# Patient Record
Sex: MALE | Race: WHITE | Employment: OTHER | URBAN - METROPOLITAN AREA
[De-identification: names, ages, dates, MRNs, and addresses within clinical notes are randomized per-mention and may not be internally consistent; named-entity substitution may affect disease eponyms.]

---

## 2017-05-22 ENCOUNTER — OFFICE VISIT (OUTPATIENT)
Dept: FAMILY MEDICINE | Facility: CLINIC | Age: 52
End: 2017-05-22
Payer: COMMERCIAL

## 2017-05-22 VITALS
WEIGHT: 224 LBS | SYSTOLIC BLOOD PRESSURE: 144 MMHG | HEIGHT: 70 IN | DIASTOLIC BLOOD PRESSURE: 90 MMHG | BODY MASS INDEX: 32.07 KG/M2 | HEART RATE: 60 BPM | TEMPERATURE: 98.2 F

## 2017-05-22 DIAGNOSIS — I10 BENIGN ESSENTIAL HYPERTENSION: ICD-10-CM

## 2017-05-22 DIAGNOSIS — L50.9 HIVES: ICD-10-CM

## 2017-05-22 DIAGNOSIS — Z00.00 ENCOUNTER FOR ROUTINE ADULT HEALTH EXAMINATION WITHOUT ABNORMAL FINDINGS: Primary | ICD-10-CM

## 2017-05-22 DIAGNOSIS — E78.5 HYPERLIPIDEMIA LDL GOAL <100: ICD-10-CM

## 2017-05-22 PROCEDURE — 99396 PREV VISIT EST AGE 40-64: CPT | Performed by: FAMILY MEDICINE

## 2017-05-22 RX ORDER — HYDROCHLOROTHIAZIDE 12.5 MG/1
12.5 TABLET ORAL DAILY
Qty: 30 TABLET | Refills: 11 | Status: SHIPPED | OUTPATIENT
Start: 2017-05-22 | End: 2017-10-03 | Stop reason: DRUGHIGH

## 2017-05-22 NOTE — MR AVS SNAPSHOT
After Visit Summary   5/22/2017    Tommy Kaba    MRN: [id number]           Patient Information     Date Of Birth          1965        Visit Information        Provider Department      5/22/2017 9:00 AM Jesusita Ferreira MD [facility]        Today's Diagnoses     Encounter for routine adult health examination without abnormal findings    -  1    Benign essential hypertension          Care Instructions      Preventive Health Recommendations  Male Ages 50 - 64    Yearly exam:             See your health care provider every year in order to  o   Review health changes.   o   Discuss preventive care.    o   Review your medicines if your doctor has prescribed any.     Have a cholesterol test every 5 years, or more frequently if you are at risk for high cholesterol/heart disease.     Have a diabetes test (fasting glucose) every three years. If you are at risk for diabetes, you should have this test more often.     Have a colonoscopy at age 50, or have a yearly FIT test (stool test). These exams will check for colon cancer.      Talk with your health care provider about whether or not a prostate cancer screening test (PSA) is right for you.    You should be tested each year for STDs (sexually transmitted diseases), if you re at risk.     Shots: Get a flu shot each year. Get a tetanus shot every 10 years.     Nutrition:    Eat at least 5 servings of fruits and vegetables daily.     Eat whole-grain bread, whole-wheat pasta and brown rice instead of white grains and rice.     Talk to your provider about Calcium and Vitamin D.     Lifestyle    Exercise for at least 150 minutes a week (30 minutes a day, 5 days a week). This will help you control your weight and prevent disease.     Limit alcohol to one drink per day.     No smoking.     Wear sunscreen to prevent skin cancer.     See your dentist every six months for an exam and cleaning.     See your eye doctor every 1 to 2  "years.          Follow-ups after your visit        Future tests that were ordered for you today     Open Future Orders        Priority Expected Expires Ordered    PSA, screen Routine  2018    Lipid Profile (Chol, Trig, HDL, LDL calc) Routine  2018    **Comprehensive metabolic panel FUTURE anytime Routine 2017            Who to contact     If you have questions or need follow up information about today's clinic visit or your schedule please contact [facility] directly at [phone].  Normal or non-critical lab and imaging results will be communicated to you by S[employer] PChart, letter or phone within 4 business days after the clinic has received the results. If you do not hear from us within 7 days, please contact the clinic through [employer]â„¢t or phone. If you have a critical or abnormal lab result, we will notify you by phone as soon as possible.  Submit refill requests through Social[employer] or call your pharmacy and they will forward the refill request to us. Please allow 3 business days for your refill to be completed.          Additional Information About Your Visit        MyChart Information     SocialSafe lets you send messages to your doctor, view your test results, renew your prescriptions, schedule appointments and more. To sign up, go to [email or web address] . Click on \"Log in\" on the left side of the screen, which will take you to the Welcome page. Then click on \"Sign up Now\" on the right side of the page.     You will be asked to enter the access code listed below, as well as some personal information. Please follow the directions to create your username and password.     Your access code is: [email or web address]  Expires: 2017  9:43 AM     Your access code will  in 90 days. If you need help or a new code, please call your [facility] or [phone].        Care EveryWhere ID     This is your Care EveryWhere ID. This could be used by other " "organizations to access your [facility] medical records  [id number]        Your Vitals Were     Pulse Temperature Height BMI (Body Mass Index)          60 98.2  F (36.8  C) (Tympanic) 5' 9.75\" (1.772 m) 32.37 kg/m2         Blood Pressure from Last 3 Encounters:   05/22/17 (!) 142/92   05/15/15 136/84   12/15/14 (!) 121/92    Weight from Last 3 Encounters:   05/22/17 224 lb (101.6 kg)   05/15/15 225 lb (102.1 kg)   12/15/14 225 lb (102.1 kg)                 Today's Medication Changes          These changes are accurate as of: 5/22/17  9:43 AM.  If you have any questions, ask your nurse or doctor.               Start taking these medicines.        Dose/Directions    hydrochlorothiazide 12.5 MG Tabs tablet   Used for:  Benign essential hypertension   Started by:  Jesusita Ferreira MD        Dose:  12.5 mg   Take 1 tablet (12.5 mg) by mouth daily   Quantity:  30 tablet   Refills:  11            Where to get your medicines      These medications were sent to [facility] [place], [place] - [address]  [address] [address]     Phone:  [phone]     hydrochlorothiazide 12.5 MG Tabs tablet                Primary Care Provider Office Phone # Fax #    Aquiles Daley [phone]       [place] [address] [place] [address]        Thank you!     Thank you for choosing [facility]  for your care. Our goal is always to provide you with excellent care. Hearing back from our patients is one way we can continue to improve our services. Please take a few minutes to complete the written survey that you may receive in the mail after your visit with us. Thank you!             Your Updated Medication List - Protect others around you: Learn how to safely use, store and throw away your medicines at www.disposemymeds.org.          This list is accurate as of: 5/22/17  9:43 AM.  Always use your most recent med list.                   Brand " Name Dispense Instructions for use    hydrochlorothiazide 12.5 MG Tabs tablet     30 tablet    Take 1 tablet (12.5 mg) by mouth daily

## 2017-05-22 NOTE — PROGRESS NOTES
SUBJECTIVE:     CC: Tommy Kaba is an 51 year old male who presents for preventative health visit.     Healthy Habits:    Do you get at least three servings of calcium containing foods daily (dairy, green leafy vegetables, etc.)? yes    Amount of exercise or daily activities, outside of work: 7 day(s) per week    Problems taking medications regularly not applicable    Medication side effects: No    Have you had an eye exam in the past two years? no    Do you see a dentist twice per year? yes    Do you have sleep apnea, excessive snoring or daytime drowsiness?daytime drowsiness        Headache     Onset: 1 mo ago     Description: Patient live next to a cell phone tower  and notice headache starting, neighbors have had illness also   Location: back of head    Character: sharp pain  Frequency:  Weekends   Duration:  Last all weekend     Intensity: moderate, severe    Progression of Symptoms:  worsening    Accompanying Signs & Symptoms:  Stiff neck: YES  Neck or upper back pain: YES  Fever: no   Sinus pressure: YES  Nausea or vomiting: YES nausea   Dizziness: no   Numbness: YES- little   Weakness: no   Visual changes: no    History:   Head trauma: YES- 3 yrs ago   Family history of migraines: no  Previous tests for headaches: no  Neurologist evaluations: no  Able to do daily activities: YES- sometimes depending on the headache   Wake with a headaches: YES  Do headaches wake you up: YES  Daily pain medication use: YES- ibuprofen ASA  Work/school stressors/changes: no    Precipitating factors:   Does light make it worse: no  Does sound make it worse: YES    Alleviating factors:  Does sleep help: YES         Therapies Tried and outcome: Ibuprofen (Advil, Motrin) ASA      Patient is a 51 yr old male here for his annual physical. He comes in with some complaints . He reports that he has been tired lately and having some headaches. He reports that the headaches comes and goes . No blurry vision . He checks his blood  pressure on occasion and his readings have also been high. No other acute symptoms. No chest pain , no shortness of breath. No history of heart disease. Patient had his cholesterol checked a few years back and it was high. Patient states that he was advised to work on his diet and exercise. He is not fasting today and will come back when he is fasting. Other concerns is that he has been experiencing hives , hives tend to come on with contact with various things. He says they are associated with itching .     Today's PHQ-2 Score:   PHQ-2 ( 1999 [employer]) 5/22/2017 6/10/2014   Q1: Little interest or pleasure in doing things 0 0   Q2: Feeling down, depressed or hopeless 0 0   PHQ-2 Score 0 0       Abuse: Current or Past(Physical, Sexual or Emotional)- No  Do you feel safe in your environment - Yes    Social History   Substance Use Topics     Smoking status: Never Smoker     Smokeless tobacco: Never Used     Alcohol use 1.0 oz/week     2 Standard drinks or equivalent per week     The patient does not drink >3 drinks per day nor >7 drinks per week.    Last PSA:   PSA   Date Value Ref Range Status   06/10/2014 1.03 0 - 4 ug/L Final       Recent Labs   Lab Test  06/10/14   1004   CHOL  276*   HDL  45   LDL  200*   TRIG  155*   CHOLHDLRATIO  6.0*       Reviewed orders with patient. Reviewed health maintenance and updated orders accordingly - Yes    Reviewed and updated as needed this visit by clinical staff  Tobacco  Allergies  Meds  Med Hx  Surg Hx  Fam Hx  Soc Hx        Reviewed and updated as needed this visit by Provider        Past Medical History:   Diagnosis Date     Nephrolithiasis 11/2013     Pleurisy 12/2013      Past Surgical History:   Procedure Laterality Date     CYSTOSCOPY, RETROGRADES, EXTRACT STONE, INSERT STENT, COMBINED  11/25/2013    Procedure: COMBINED CYSTOSCOPY, RETROGRADES, EXTRACT STONE, INSERT STENT;;  Surgeon: Delfino Lewis MD;  Location: [facility]     CYSTOSCOPY, URETEROSCOPY, COMBINED   11/25/2013    Procedure: COMBINED CYSTOSCOPY, URETEROSCOPY;  CYSTOSCOPY, RIGHT URETEROSCOPY, RETROGRADES, EXTRACT STONE, INSERT STENT;  Surgeon: Delfino Lewis MD;  Location:  OR     ENT SURGERY      tonsillectomy     ESOPHAGOSCOPY, GASTROSCOPY, DUODENOSCOPY (EGD), COMBINED N/A 12/15/2014    Procedure: COMBINED ESOPHAGOSCOPY, GASTROSCOPY, DUODENOSCOPY (EGD);  Surgeon: Moe Page MD;  Location: [facility]       ROS:  C: NEGATIVE for fever, chills, change in weight  I: NEGATIVE for worrisome rashes, moles or lesions  E: NEGATIVE for vision changes or irritation  ENT: NEGATIVE for ear, mouth and throat problems  R: NEGATIVE for significant cough or SOB  CV: NEGATIVE for chest pain, palpitations or peripheral edema  GI: NEGATIVE for nausea, abdominal pain, heartburn, or change in bowel habits   male: negative for dysuria, hematuria, decreased urinary stream, erectile dysfunction, urethral discharge  M: NEGATIVE for significant arthralgias or myalgia  N: NEGATIVE for weakness, dizziness or paresthesias  P: NEGATIVE for changes in mood or affect    Problem list, Medication list, Allergies, and Medical/Social/Surgical histories reviewed in [facility] and updated as appropriate.  Labs reviewed in EPIC  BP Readings from Last 3 Encounters:   05/22/17 144/90   05/15/15 136/84   12/15/14 (!) 121/92    Wt Readings from Last 3 Encounters:   05/22/17 224 lb (101.6 kg)   05/15/15 225 lb (102.1 kg)   12/15/14 225 lb (102.1 kg)                  Patient Active Problem List   Diagnosis     CARDIOVASCULAR SCREENING; LDL GOAL LESS THAN 130     Polyp of colon, adenomatous     Past Surgical History:   Procedure Laterality Date     CYSTOSCOPY, RETROGRADES, EXTRACT STONE, INSERT STENT, COMBINED  11/25/2013    Procedure: COMBINED CYSTOSCOPY, RETROGRADES, EXTRACT STONE, INSERT STENT;;  Surgeon: Delfino Lewis MD;  Location:  OR     CYSTOSCOPY, URETEROSCOPY, COMBINED  11/25/2013    Procedure: COMBINED CYSTOSCOPY, URETEROSCOPY;   "CYSTOSCOPY, RIGHT URETEROSCOPY, RETROGRADES, EXTRACT STONE, INSERT STENT;  Surgeon: Delfino Lewis MD;  Location: [facility]     ENT SURGERY      tonsillectomy     ESOPHAGOSCOPY, GASTROSCOPY, DUODENOSCOPY (EGD), COMBINED N/A 12/15/2014    Procedure: COMBINED ESOPHAGOSCOPY, GASTROSCOPY, DUODENOSCOPY (EGD);  Surgeon: Moe Page MD;  Location: [facility]       Social History   Substance Use Topics     Smoking status: Never Smoker     Smokeless tobacco: Never Used     Alcohol use 1.0 oz/week     2 Standard drinks or equivalent per week     Family History   Problem Relation Age of Onset     Genitourinary Problems Mother      nephrolithiasis     Prostate Cancer Father 76         Current Outpatient Prescriptions   Medication Sig Dispense Refill     hydrochlorothiazide 12.5 MG TABS tablet Take 1 tablet (12.5 mg) by mouth daily 30 tablet 11     Allergies   Allergen Reactions     Doxycycline Hives     OBJECTIVE:     /90  Pulse 60  Temp 98.2  F (36.8  C) (Tympanic)  Ht 5' 9.75\" (1.772 m)  Wt 224 lb (101.6 kg)  BMI 32.37 kg/m2  EXAM:  GENERAL: healthy, alert and no distress  EYES: Eyes grossly normal to inspection, PERRL and conjunctivae and sclerae normal  HENT: ear canals and TM's normal, nose and mouth without ulcers or lesions  NECK: no adenopathy, no asymmetry, masses, or scars and thyroid normal to palpation  RESP: lungs clear to auscultation - no rales, rhonchi or wheezes  CV: regular rate and rhythm, normal S1 S2, no S3 or S4, no murmur, click or rub, no peripheral edema and peripheral pulses strong  ABDOMEN: soft, nontender, no hepatosplenomegaly, no masses and bowel sounds normal   (male): testicles normal without atrophy or masses  MS: no gross musculoskeletal defects noted, no edema  SKIN: no suspicious lesions or rashes  NEURO: Normal strength and tone, mentation intact and speech normal  PSYCH: mentation appears normal, affect normal/bright    ASSESSMENT/PLAN:     (Z00.00) Encounter for routine adult " "health examination without abnormal findings  (primary encounter diagnosis)  Comment: .Patient will be notified of results  Plan: Lipid Profile (Chol, Trig, HDL, LDL calc),         Comprehensive metabolic panel FUTURE anytime,        PSA, screen      (I10) Benign essential hypertension  Comment: BP is above normal  Plan: hydrochlorothiazide 12.5 MG TABS tablet         (E78.5) Hyperlipidemia LDL goal <100  Comment: .Patient will be notified of results  Plan: Lipid Profile (Chol, Trig, HDL, LDL calc)      (L50.9) Hives  Comment: Unknown cause  Plan: Zyrtec daily           COUNSELING:  Reviewed preventive health counseling, as reflected in patient instructions       Regular exercise       Healthy diet/nutrition       Vision screening         reports that he has never smoked. He has never used smokeless tobacco.    Estimated body mass index is 32.37 kg/(m^2) as calculated from the following:    Height as of this encounter: 5' 9.75\" (1.772 m).    Weight as of this encounter: 224 lb (101.6 kg).   Weight management plan: Discussed healthy diet and exercise guidelines and patient will follow up in 12 months in clinic to re-evaluate.    Counseling Resources:  ATP IV Guidelines  Pooled Cohorts Equation Calculator  FRAX Risk Assessment  ICSI Preventive Guidelines  Dietary Guidelines for Americans, 2010  USDA's MyPlate  ASA Prophylaxis  Lung CA Screening    Jesusita Ferreira MD  [facility]  "

## 2017-05-22 NOTE — NURSING NOTE
"Chief Complaint   Patient presents with     Physical       Initial BP (!) 142/92  Pulse 60  Temp 98.2  F (36.8  C) (Tympanic)  Ht 5' 9.75\" (1.772 m)  Wt 224 lb (101.6 kg)  BMI 32.37 kg/m2 Estimated body mass index is 32.37 kg/(m^2) as calculated from the following:    Height as of this encounter: 5' 9.75\" (1.772 m).    Weight as of this encounter: 224 lb (101.6 kg).  Medication Reconciliation: complete  "

## 2017-05-30 PROBLEM — E78.5 HYPERLIPIDEMIA LDL GOAL <100: Status: ACTIVE | Noted: 2017-05-30

## 2017-05-30 PROBLEM — I10 BENIGN ESSENTIAL HYPERTENSION: Status: ACTIVE | Noted: 2017-05-30

## 2017-06-05 ENCOUNTER — OFFICE VISIT (OUTPATIENT)
Dept: FAMILY MEDICINE | Facility: CLINIC | Age: 52
End: 2017-06-05
Payer: COMMERCIAL

## 2017-06-05 ENCOUNTER — TELEPHONE (OUTPATIENT)
Dept: FAMILY MEDICINE | Facility: CLINIC | Age: 52
End: 2017-06-05

## 2017-06-05 VITALS — SYSTOLIC BLOOD PRESSURE: 141 MMHG | HEART RATE: 53 BPM | DIASTOLIC BLOOD PRESSURE: 94 MMHG

## 2017-06-05 DIAGNOSIS — Z01.30 BP CHECK: Primary | ICD-10-CM

## 2017-06-05 DIAGNOSIS — Z00.00 ENCOUNTER FOR ROUTINE ADULT HEALTH EXAMINATION WITHOUT ABNORMAL FINDINGS: ICD-10-CM

## 2017-06-05 DIAGNOSIS — E78.5 HYPERLIPIDEMIA LDL GOAL <100: ICD-10-CM

## 2017-06-05 LAB
ALBUMIN SERPL-MCNC: 3.8 G/DL (ref 3.4–5)
ALP SERPL-CCNC: 57 U/L (ref 40–150)
ALT SERPL W P-5'-P-CCNC: 60 U/L (ref 0–70)
ANION GAP SERPL CALCULATED.3IONS-SCNC: 6 MMOL/L (ref 3–14)
AST SERPL W P-5'-P-CCNC: 40 U/L (ref 0–45)
BILIRUB SERPL-MCNC: 0.4 MG/DL (ref 0.2–1.3)
BUN SERPL-MCNC: 14 MG/DL (ref 7–30)
CALCIUM SERPL-MCNC: 8.9 MG/DL (ref 8.5–10.1)
CHLORIDE SERPL-SCNC: 102 MMOL/L (ref 94–109)
CHOLEST SERPL-MCNC: 251 MG/DL
CO2 SERPL-SCNC: 27 MMOL/L (ref 20–32)
CREAT SERPL-MCNC: 1.02 MG/DL (ref 0.66–1.25)
GFR SERPL CREATININE-BSD FRML MDRD: 76 ML/MIN/1.7M2
GLUCOSE SERPL-MCNC: 93 MG/DL (ref 70–99)
HDLC SERPL-MCNC: 47 MG/DL
LDLC SERPL CALC-MCNC: 172 MG/DL
NONHDLC SERPL-MCNC: 204 MG/DL
POTASSIUM SERPL-SCNC: 4 MMOL/L (ref 3.4–5.3)
PROT SERPL-MCNC: 7.7 G/DL (ref 6.8–8.8)
PSA SERPL-ACNC: 0.9 UG/L (ref 0–4)
SODIUM SERPL-SCNC: 135 MMOL/L (ref 133–144)
TRIGL SERPL-MCNC: 161 MG/DL

## 2017-06-05 PROCEDURE — 99207 ZZC NO CHARGE NURSE ONLY: CPT

## 2017-06-05 PROCEDURE — G0103 PSA SCREENING: HCPCS | Performed by: FAMILY MEDICINE

## 2017-06-05 PROCEDURE — 80061 LIPID PANEL: CPT | Performed by: FAMILY MEDICINE

## 2017-06-05 PROCEDURE — 36415 COLL VENOUS BLD VENIPUNCTURE: CPT | Performed by: FAMILY MEDICINE

## 2017-06-05 PROCEDURE — 80053 COMPREHEN METABOLIC PANEL: CPT | Performed by: FAMILY MEDICINE

## 2017-06-05 NOTE — TELEPHONE ENCOUNTER
Patient following up for BP check after started HCTZ 17  Patient takes BP at home on the weekends, last BP this weekend 138/82  Patient taking BP medication as prescribed daily  Patient reports taking Zyrtec since 17 for environmental allergies, taking every other day, last dose 6/3/17   Patient reports he notices when he takes Zyrtec with HCTZ his BP is more elevated     BP's today:  1st readin/100  2nd readin/94     Above goal     Routing to provider.     Milena GONCALVES Rn

## 2017-06-05 NOTE — MR AVS SNAPSHOT
"              After Visit Summary   2017    Tommy Kaba    MRN: [id number]           Patient Information     Date Of Birth          1965        Visit Information        Provider Department      2017 10:00 AM ELEAZAR LOVE/LUCIAN LEOS [facility]        Today's Diagnoses     BP check    -  1       Follow-ups after your visit        Who to contact     If you have questions or need follow up information about today's clinic visit or your schedule please contact [facility] directly at [phone].  Normal or non-critical lab and imaging results will be communicated to you by MyChart, letter or phone within 4 business days after the clinic has received the results. If you do not hear from us within 7 days, please contact the clinic through [employer] or phone. If you have a critical or abnormal lab result, we will notify you by phone as soon as possible.  Submit refill requests through [employer] or call your pharmacy and they will forward the refill request to us. Please allow 3 business days for your refill to be completed.          Additional Information About Your Visit        MyChart Information     [employer] lets you send messages to your doctor, view your test results, renew your prescriptions, schedule appointments and more. To sign up, go to [email or web address] . Click on \"Log in\" on the left side of the screen, which will take you to the Welcome page. Then click on \"Sign up Now\" on the right side of the page.     You will be asked to enter the access code listed below, as well as some personal information. Please follow the directions to create your username and password.     Your access code is: [email or web address]  Expires: 2017  9:43 AM     Your access code will  in 90 days. If you need help or a new code, please call your [facility] or [phone].        Care EveryWhere ID     This is your Care EveryWhere ID. This could be used by other organizations to access your " [place] medical records  [id number]        Your Vitals Were     Pulse                   53            Blood Pressure from Last 3 Encounters:   06/05/17 (!) 141/94   05/22/17 144/90   05/15/15 136/84    Weight from Last 3 Encounters:   05/22/17 224 lb (101.6 kg)   05/15/15 225 lb (102.1 kg)   12/15/14 225 lb (102.1 kg)              Today, you had the following     No orders found for display       Primary Care Provider Office Phone # Fax #    Aquiles Daley [phone]       [facility] [address] [place] [address]        Thank you!     Thank you for choosing [facility]  for your care. Our goal is always to provide you with excellent care. Hearing back from our patients is one way we can continue to improve our services. Please take a few minutes to complete the written survey that you may receive in the mail after your visit with us. Thank you!             Your Updated Medication List - Protect others around you: Learn how to safely use, store and throw away your medicines at www.disposemymeds.org.          This list is accurate as of: 6/5/17 10:07 AM.  Always use your most recent med list.                   Brand Name Dispense Instructions for use    hydrochlorothiazide 12.5 MG Tabs tablet     30 tablet    Take 1 tablet (12.5 mg) by mouth daily

## 2017-06-09 DIAGNOSIS — E78.5 HYPERLIPIDEMIA LDL GOAL <100: Primary | ICD-10-CM

## 2017-06-09 RX ORDER — ATORVASTATIN CALCIUM 10 MG/1
10 TABLET, FILM COATED ORAL DAILY
Qty: 90 TABLET | Refills: 1 | Status: SHIPPED | OUTPATIENT
Start: 2017-06-09 | End: 2019-03-01

## 2017-06-09 NOTE — PROGRESS NOTES
Faxed Lipitor to [facility] pharmacy in [place]. Please remind patient to come in for a BP check . Thanks

## 2017-06-09 NOTE — PROGRESS NOTES
Please inform patient that test result showed that he had high cholesterol. Recommend treating with medication  I will fax medication to pharmacy for patient.    Thank you.     Jesusita Ferreira M.D.

## 2017-09-21 ENCOUNTER — TELEPHONE (OUTPATIENT)
Dept: FAMILY MEDICINE | Facility: CLINIC | Age: 52
End: 2017-09-21

## 2017-09-21 NOTE — TELEPHONE ENCOUNTER
Panel Management Review      Patient has the following on his problem list:     Hypertension   Last three blood pressure readings:  BP Readings from Last 3 Encounters:   06/05/17 (!) 141/94   05/22/17 144/90   05/15/15 136/84     Blood pressure: FAILED    HTN Guidelines:  Age 18-59 BP range:  Less than 140/90  Age 60-85 with Diabetes:  Less than 140/90  Age 60-85 without Diabetes:  less than 150/90        Composite cancer screening  Chart review shows that this patient is due/due soon for the following   Summary:    Patient is due/failing the following:   BP CHECK    Action needed:   Patient needs office visit for with RN.    Type of outreach:    Phone, spoke to patient.  talked to patient will try to come in, in the next 2 weeks     Questions for provider review:                                                                                                                                        Lilia Espinosa CMA     Chart routed to  .

## 2017-09-21 NOTE — TELEPHONE ENCOUNTER
Patient is due for a bp check   BP Readings from Last 3 Encounters:   06/05/17 (!) 141/94   05/22/17 144/90   05/15/15 136/84   Left message to call clinic

## 2017-10-02 ENCOUNTER — ALLIED HEALTH/NURSE VISIT (OUTPATIENT)
Dept: FAMILY MEDICINE | Facility: CLINIC | Age: 52
End: 2017-10-02
Payer: COMMERCIAL

## 2017-10-02 ENCOUNTER — TELEPHONE (OUTPATIENT)
Dept: FAMILY MEDICINE | Facility: CLINIC | Age: 52
End: 2017-10-02

## 2017-10-02 VITALS — HEART RATE: 58 BPM | DIASTOLIC BLOOD PRESSURE: 90 MMHG | SYSTOLIC BLOOD PRESSURE: 131 MMHG

## 2017-10-02 DIAGNOSIS — I10 BENIGN ESSENTIAL HYPERTENSION: Primary | ICD-10-CM

## 2017-10-02 DIAGNOSIS — I10 BENIGN ESSENTIAL HYPERTENSION: ICD-10-CM

## 2017-10-02 PROCEDURE — 99207 ZZC NO CHARGE NURSE ONLY: CPT

## 2017-10-02 RX ORDER — HYDROCHLOROTHIAZIDE 12.5 MG/1
25 TABLET ORAL DAILY
Qty: 60 TABLET | Refills: 6 | Status: SHIPPED | OUTPATIENT
Start: 2017-10-02 | End: 2019-03-01

## 2017-10-02 NOTE — MR AVS SNAPSHOT
"              After Visit Summary   10/2/2017    Tommy Kaba    MRN: [id number]           Patient Information     Date Of Birth          1965        Visit Information        Provider Department      10/2/2017 1:15 PM ELEAZAR LOVE/LUCIAN LEOS [facility]        Today's Diagnoses     Benign essential hypertension           Follow-ups after your visit        Who to contact     If you have questions or need follow up information about today's clinic visit or your schedule please contact [facility] directly at [phone].  Normal or non-critical lab and imaging results will be communicated to you by [facility], letter or phone within 4 business days after the clinic has received the results. If you do not hear from us within 7 days, please contact the clinic through [facility] or phone. If you have a critical or abnormal lab result, we will notify you by phone as soon as possible.  Submit refill requests through [employer] or call your pharmacy and they will forward the refill request to us. Please allow 3 business days for your refill to be completed.          Additional Information About Your Visit        MyChart Information     [employer] lets you send messages to your doctor, view your test results, renew your prescriptions, schedule appointments and more. To sign up, go to [email or web address] . Click on \"Log in\" on the left side of the screen, which will take you to the Welcome page. Then click on \"Sign up Now\" on the right side of the page.     You will be asked to enter the access code listed below, as well as some personal information. Please follow the directions to create your username and password.     Your access code is: [email or web address]  Expires: 2017  3:59 PM     Your access code will  in 90 days. If you need help or a new code, please call your [facility] or [phone].        Care EveryWhere ID     This is your Care EveryWhere ID. This could be used by other " organizations to access your [place] medical records  [id number]        Your Vitals Were     Pulse                   58            Blood Pressure from Last 3 Encounters:   10/02/17 131/90   06/05/17 (!) 141/94   05/22/17 144/90    Weight from Last 3 Encounters:   05/22/17 224 lb (101.6 kg)   05/15/15 225 lb (102.1 kg)   12/15/14 225 lb (102.1 kg)              Today, you had the following     No orders found for display       Primary Care Provider Office Phone # Fax #    Memeclaribel Ernesto Ferreira [phone] [address] [facility] [address]        Equal Access to Services     RENEE WALTON : Herbert Oneil, wavaleria bermeo, qaybta kaalmada andrew, vicky jimenez. So [facility] [phone].    ATENCIÓN: Si habla español, tiene a holliday disposición servicios gratuitos de asistencia lingüística. Llame al [phone].    We comply with applicable federal civil rights laws and [place] laws. We do not discriminate on the basis of race, color, national origin, age, disability, sex, sexual orientation, or gender identity.            Thank you!     Thank you for choosing [facility]  for your care. Our goal is always to provide you with excellent care. Hearing back from our patients is one way we can continue to improve our services. Please take a few minutes to complete the written survey that you may receive in the mail after your visit with us. Thank you!             Your Updated Medication List - Protect others around you: Learn how to safely use, store and throw away your medicines at www.disposemymeds.org.          This list is accurate as of: 10/2/17  3:59 PM.  Always use your most recent med list.                   Brand Name Dispense Instructions for use Diagnosis    atorvastatin 10 MG tablet    LIPITOR    90 tablet    Take 1 tablet (10 mg) by mouth daily    Hyperlipidemia LDL goal <100       hydrochlorothiazide 12.5 MG Tabs tablet      30 tablet    Take 1 tablet (12.5 mg) by mouth daily    Benign essential hypertension

## 2017-10-02 NOTE — NURSING NOTE
Pt presented to clinic for RN blood pressure recheck.  He was last seen in June.    Today's reading is 133/89, pulse of 56.  Recheck 5 min later is 131-90, pulse of 58.    Pt is slightly above goal today.    Will sent a telephone note to provider seeking further instructions.  Dayan Beatty RN    BP Readings from Last 6 Encounters:   10/02/17 131/90   06/05/17 (!) 141/94   05/22/17 144/90   05/15/15 136/84   12/15/14 (!) 121/92   06/10/14 126/84     Lab Results   Component Value Date    POTASSIUM 4.0 06/05/2017     Lab Results   Component Value Date    CR 1.02 06/05/2017

## 2017-11-01 ENCOUNTER — TELEPHONE (OUTPATIENT)
Dept: FAMILY MEDICINE | Facility: CLINIC | Age: 52
End: 2017-11-01

## 2017-11-01 DIAGNOSIS — I10 BENIGN ESSENTIAL HYPERTENSION: Primary | ICD-10-CM

## 2017-11-01 NOTE — TELEPHONE ENCOUNTER
----- Message from Jesusita Ferreira MD sent at 11/1/2017  1:34 PM CDT -----  Regarding: BP recheck  Please call patient to remind him to come in for a nurse visit to have his blood pressure rechecked. I spoke to patient on the phone and he says he is going out of town this week and will come in next week.Thanks.    Dr Ferreira

## 2017-11-10 ENCOUNTER — ALLIED HEALTH/NURSE VISIT (OUTPATIENT)
Dept: FAMILY MEDICINE | Facility: CLINIC | Age: 52
End: 2017-11-10
Payer: COMMERCIAL

## 2017-11-10 VITALS — DIASTOLIC BLOOD PRESSURE: 96 MMHG | SYSTOLIC BLOOD PRESSURE: 146 MMHG | HEART RATE: 54 BPM

## 2017-11-10 DIAGNOSIS — I10 HTN (HYPERTENSION): Primary | ICD-10-CM

## 2017-11-10 PROCEDURE — 99207 ZZC NO CHARGE NURSE ONLY: CPT

## 2017-11-10 NOTE — NURSING NOTE
Patient here today as had change in dosage of HCTZ and is now taking 25 mg/day. Patient denies shortness of breath, no chest pain, but past month has had an episode of chest pain that resolved, states he is having head ache pain that has been going ongoing for him, but does not describe as migraine or the worst head pain and resolves with tylenol of ibuprofen. Also wakes up feeling not rested and has been having numbness in forearms, thinks it is carpal tunnel. States he is light headed off and on and feels lightheaded today.  Patient walks 3-4 times daily and is not adding salt to diet and reports is trying to eat more vegetables. B/P elevated today. Have attempted to have patient seen today with symptoms described above but provider asked unable to, then discussed with Dr. Ferreira and told PCP that patient looks stable and described elevated B/P today and head ache and since stable can be seen on Monday, appointment is scheduled. Dr. Ferreira states will review vitals and will adjust medication today, pharmacy is pended. Will route to PCP to review. Advised to patient to use tylenol or ibuprofen or ice for head ache pain or if head ache worsens to 10/10 and is the worst head pain, or has chest pain, shortness of breath or weakness, severe dizziness or lightheaded ness or if tingling or numbness worsen, to seek the ER over weekend.   DAFNE Coats

## 2017-11-10 NOTE — MR AVS SNAPSHOT
"              After Visit Summary   11/10/2017    Tommy Kaba    MRN: [id number]           Patient Information     Date Of Birth          1965        Visit Information        Provider Department      11/10/2017 10:30 AM ELEAZAR LOVE/LUCIAN LEOS [facility]        Today's Diagnoses     HTN (hypertension)    -  1       Follow-ups after your visit        Your next 10 appointments already scheduled     Nov 13, 2017 10:20 AM CST   SHORT with Jesusita Ferreira MD   [facility]    [address] [facility] [facility] [address] [phone]              Who to contact     If you have questions or need follow up information about today's clinic visit or your schedule please contact [facility] directly at [phone].  Normal or non-critical lab and imaging results will be communicated to you by MyChart, letter or phone within 4 business days after the clinic has received the results. If you do not hear from us within 7 days, please contact the clinic through MyChart or phone. If you have a critical or abnormal lab result, we will notify you by phone as soon as possible.  Submit refill requests through [employer] or call your pharmacy and they will forward the refill request to us. Please allow 3 business days for your refill to be completed.          Additional Information About Your Visit        MyChart Information     [employer] lets you send messages to your doctor, view your test results, renew your prescriptions, schedule appointments and more. To sign up, go to [email or web address] . Click on \"Log in\" on the left side of the screen, which will take you to the Welcome page. Then click on \"Sign up Now\" on the right side of the page.     You will be asked to enter the access code listed below, as well as some personal information. Please follow the directions to create your username and password.     Your access code is: [email or web address]  Expires: " 2017  2:59 PM     Your access code will  in 90 days. If you need help or a new code, please call your [place] clinic or [phone].        Care EveryWhere ID     This is your Care EveryWhere ID. This could be used by other organizations to access your [place] medical records  [id number]        Your Vitals Were     Pulse                   54            Blood Pressure from Last 3 Encounters:   11/10/17 (!) 146/96   10/02/17 131/90   17 (!) 141/94    Weight from Last 3 Encounters:   17 224 lb (101.6 kg)   05/15/15 225 lb (102.1 kg)   12/15/14 225 lb (102.1 kg)              Today, you had the following     No orders found for display       Primary Care Provider Office Phone # Fax #    Jesusita Ferreira [phone] [address] [facility] [address]        Equal Access to Services     RENEE WALTON : Hadii aad ku hadasho Soomaali, waaxda luqadaha, qaybta kaalmada adeegyada, waxay pedro luisin hayjavonn cintia moncada . So [facility] [phone].    ATENCIÓN: Si habla español, tiene a holliday disposición servicios gratuitos de asistencia lingüística. Llame al [phone].    We comply with applicable federal civil rights laws and [place] laws. We do not discriminate on the basis of race, color, national origin, age, disability, sex, sexual orientation, or gender identity.            Thank you!     Thank you for choosing [facility]  for your care. Our goal is always to provide you with excellent care. Hearing back from our patients is one way we can continue to improve our services. Please take a few minutes to complete the written survey that you may receive in the mail after your visit with us. Thank you!             Your Updated Medication List - Protect others around you: Learn how to safely use, store and throw away your medicines at www.disposemymeds.org.          This list is accurate as of: 11/10/17 11:07 AM.  Always use your most recent med list.                    Brand Name Dispense Instructions for use Diagnosis    atorvastatin 10 MG tablet    LIPITOR    90 tablet    Take 1 tablet (10 mg) by mouth daily    Hyperlipidemia LDL goal <100       hydrochlorothiazide 12.5 MG Tabs tablet     60 tablet    Take 2 tablets (25 mg) by mouth daily    Benign essential hypertension

## 2017-11-13 ENCOUNTER — OFFICE VISIT (OUTPATIENT)
Dept: FAMILY MEDICINE | Facility: CLINIC | Age: 52
End: 2017-11-13
Payer: COMMERCIAL

## 2017-11-13 VITALS
HEIGHT: 70 IN | BODY MASS INDEX: 31.92 KG/M2 | HEART RATE: 60 BPM | WEIGHT: 223 LBS | DIASTOLIC BLOOD PRESSURE: 96 MMHG | SYSTOLIC BLOOD PRESSURE: 146 MMHG | TEMPERATURE: 97.3 F

## 2017-11-13 DIAGNOSIS — G44.209 TENSION HEADACHE: ICD-10-CM

## 2017-11-13 DIAGNOSIS — M25.50 MULTIPLE JOINT PAIN: ICD-10-CM

## 2017-11-13 DIAGNOSIS — I10 BENIGN ESSENTIAL HYPERTENSION: Primary | ICD-10-CM

## 2017-11-13 LAB
CRP SERPL-MCNC: <2.9 MG/L (ref 0–8)
ERYTHROCYTE [SEDIMENTATION RATE] IN BLOOD BY WESTERGREN METHOD: 8 MM/H (ref 0–20)

## 2017-11-13 PROCEDURE — 85652 RBC SED RATE AUTOMATED: CPT | Performed by: FAMILY MEDICINE

## 2017-11-13 PROCEDURE — 36415 COLL VENOUS BLD VENIPUNCTURE: CPT | Performed by: FAMILY MEDICINE

## 2017-11-13 PROCEDURE — 99214 OFFICE O/P EST MOD 30 MIN: CPT | Performed by: FAMILY MEDICINE

## 2017-11-13 PROCEDURE — 86618 LYME DISEASE ANTIBODY: CPT | Performed by: FAMILY MEDICINE

## 2017-11-13 PROCEDURE — 86431 RHEUMATOID FACTOR QUANT: CPT | Performed by: FAMILY MEDICINE

## 2017-11-13 PROCEDURE — 86140 C-REACTIVE PROTEIN: CPT | Performed by: FAMILY MEDICINE

## 2017-11-13 RX ORDER — LISINOPRIL 10 MG/1
10 TABLET ORAL DAILY
Qty: 30 TABLET | Refills: 6 | Status: SHIPPED | OUTPATIENT
Start: 2017-11-13 | End: 2017-11-22 | Stop reason: SINTOL

## 2017-11-13 RX ORDER — NAPROXEN 500 MG/1
500 TABLET ORAL 2 TIMES DAILY PRN
Qty: 60 TABLET | Refills: 1 | Status: SHIPPED | OUTPATIENT
Start: 2017-11-13 | End: 2017-11-22

## 2017-11-13 RX ORDER — CYCLOBENZAPRINE HCL 10 MG
5-10 TABLET ORAL 3 TIMES DAILY PRN
Qty: 30 TABLET | Refills: 1 | Status: SHIPPED | OUTPATIENT
Start: 2017-11-13 | End: 2018-08-20

## 2017-11-13 RX ORDER — NAPROXEN 500 MG/1
500 TABLET ORAL 2 TIMES DAILY PRN
Qty: 60 TABLET | Refills: 1 | Status: SHIPPED | OUTPATIENT
Start: 2017-11-13 | End: 2017-11-13

## 2017-11-13 RX ORDER — CYCLOBENZAPRINE HCL 10 MG
5-10 TABLET ORAL 3 TIMES DAILY PRN
Qty: 30 TABLET | Refills: 1 | Status: SHIPPED | OUTPATIENT
Start: 2017-11-13 | End: 2017-11-13

## 2017-11-13 RX ORDER — LISINOPRIL 10 MG/1
10 TABLET ORAL DAILY
Qty: 30 TABLET | Refills: 6 | Status: SHIPPED | OUTPATIENT
Start: 2017-11-13 | End: 2017-11-13

## 2017-11-13 NOTE — NURSING NOTE
"Chief Complaint   Patient presents with     Headache     back of the head      Joint Pain     joint pain throughout body      Hypertension     has been high        Initial BP (!) 145/95  Pulse 60  Temp 97.3  F (36.3  C) (Tympanic)  Ht 5' 9.75\" (1.772 m)  Wt 223 lb (101.2 kg)  BMI 32.23 kg/m2 Estimated body mass index is 32.23 kg/(m^2) as calculated from the following:    Height as of this encounter: 5' 9.75\" (1.772 m).    Weight as of this encounter: 223 lb (101.2 kg).  Medication Reconciliation: complete  "

## 2017-11-13 NOTE — PROGRESS NOTES
SUBJECTIVE:   Tommy Kaba is a 52 year old male who presents to clinic today for the following health issues:      Hypertension Follow-up      Outpatient blood pressures are being checked at home.  Results are 162-105 in the AM and than dropped before coming in 152-96.    Low Salt Diet: low salt        Amount of exercise or physical activity: 4-5 days/week for an average of 30-45 minutes    Problems taking medications regularly: No    Medication side effects: none    Diet: low salt    Headache  Onset: 4 weeks ago     Description: Patient had a severe headache 4 weeks ago and still has some pain in the back of his head   Location: back of head   Character: sharp pain  Frequency:  4 weeks ago   Duration:  The migraine he had 4 weeks ago let up but still has pain in the back of his head     Intensity: moderate    Progression of Symptoms:  Improving from migraine 4 weeks ago      Accompanying Signs & Symptoms:  Stiff neck: YES  Neck or upper back pain: YES  Fever: no   Sinus pressure: YES  Nausea or vomiting: YES- nausea   Dizziness: no   Numbness: no   Weakness: no   Visual changes: no     History:   Head trauma: no   Family history of migraines: no   Previous tests for headaches: no   Neurologist evaluations: no   Able to do daily activities: YES  Wake with a headaches: YES  Do headaches wake you up: no   Daily pain medication use: YES  Work/school stressors/changes: no     Precipitating factors:   Does light make it worse: no   Does sound make it worse: YES    Alleviating factors:  Does sleep help: no    Therapies Tried and outcome: Ibuprofen (Advil, Motrin)  Joint Pain    Onset: 1 mo ago     Description:   Location: left elbow, right elbow, left knee, right knee, left ankle and right ankle  Character: Sharp and shooting     Intensity: moderate    Progression of Symptoms: worse    Accompanying Signs & Symptoms:  Other symptoms:  numbness and tingling    History:   Previous similar pain: YES- knee        Precipitating factors:   Trauma or overuse: no     Alleviating factors:  Improved by: massage heat     Therapies Tried and outcome: Patient tried messing and soaking in a hot tub       Patient is a 52 yr old male with multiple concerns today. He reports that he has been checking his blood pressure at home and they are still running high. He denies any chest pain or blurry vision .   He also reports that he has been having severe occipital headaches.He has had neck pain with the headaches.     Patient also has generalized joint pains. This has been ongoing for months. He had an episode of Hives earlier on in the year and since then he had generalized joint pain. Denies any rheumatoid arthritis in his family .      Problem list and histories reviewed & adjusted, as indicated.  Additional history: as documented    Patient Active Problem List   Diagnosis     CARDIOVASCULAR SCREENING; LDL GOAL LESS THAN 130     Polyp of colon, adenomatous     Benign essential hypertension     Hyperlipidemia LDL goal <100     Past Surgical History:   Procedure Laterality Date     CYSTOSCOPY, RETROGRADES, EXTRACT STONE, INSERT STENT, COMBINED  11/25/2013    Procedure: COMBINED CYSTOSCOPY, RETROGRADES, EXTRACT STONE, INSERT STENT;;  Surgeon: Delfino Lewis MD;  Location:  OR     CYSTOSCOPY, URETEROSCOPY, COMBINED  11/25/2013    Procedure: COMBINED CYSTOSCOPY, URETEROSCOPY;  CYSTOSCOPY, RIGHT URETEROSCOPY, RETROGRADES, EXTRACT STONE, INSERT STENT;  Surgeon: Delfino Lewis MD;  Location:  OR     ENT SURGERY      tonsillectomy     ESOPHAGOSCOPY, GASTROSCOPY, DUODENOSCOPY (EGD), COMBINED N/A 12/15/2014    Procedure: COMBINED ESOPHAGOSCOPY, GASTROSCOPY, DUODENOSCOPY (EGD);  Surgeon: Moe Page MD;  Location: [facility]       Social History   Substance Use Topics     Smoking status: Never Smoker     Smokeless tobacco: Never Used     Alcohol use 1.0 oz/week     2 Standard drinks or equivalent per week     Family History  "  Problem Relation Age of Onset     Genitourinary Problems Mother      nephrolithiasis     Prostate Cancer Father 76         Current Outpatient Prescriptions   Medication Sig Dispense Refill     lisinopril (PRINIVIL/ZESTRIL) 10 MG tablet Take 1 tablet (10 mg) by mouth daily 30 tablet 6     naproxen (NAPROSYN) 500 MG tablet Take 1 tablet (500 mg) by mouth 2 times daily as needed for moderate pain 60 tablet 1     cyclobenzaprine (FLEXERIL) 10 MG tablet Take 0.5-1 tablets (5-10 mg) by mouth 3 times daily as needed for muscle spasms 30 tablet 1     hydrochlorothiazide 12.5 MG TABS tablet Take 2 tablets (25 mg) by mouth daily 60 tablet 6     atorvastatin (LIPITOR) 10 MG tablet Take 1 tablet (10 mg) by mouth daily 90 tablet 1     [DISCONTINUED] lisinopril (PRINIVIL/ZESTRIL) 10 MG tablet Take 1 tablet (10 mg) by mouth daily 30 tablet 6     Allergies   Allergen Reactions     Doxycycline Hives     BP Readings from Last 3 Encounters:   11/13/17 (!) 146/96   11/10/17 (!) 146/96   10/02/17 131/90    Wt Readings from Last 3 Encounters:   11/13/17 223 lb (101.2 kg)   05/22/17 224 lb (101.6 kg)   05/15/15 225 lb (102.1 kg)            Labs reviewed in EPIC        Reviewed and updated as needed this visit by clinical staffTobacco  Allergies  Med Hx  Surg Hx  Fam Hx  Soc Hx      Reviewed and updated as needed this visit by Provider         ROS:  Constitutional, HEENT, cardiovascular, pulmonary, gi and gu systems are negative, except as otherwise noted.      OBJECTIVE:   BP (!) 146/96  Pulse 60  Temp 97.3  F (36.3  C) (Tympanic)  Ht 5' 9.75\" (1.772 m)  Wt 223 lb (101.2 kg)  BMI 32.23 kg/m2  Body mass index is 32.23 kg/(m^2).  GENERAL: healthy, alert and no distress  EYES: Eyes grossly normal to inspection, PERRL and conjunctivae and sclerae normal  HENT: ear canals and TM's normal, nose and mouth without ulcers or lesions  NECK: no adenopathy, no asymmetry, masses, or scars and thyroid normal to palpation  RESP: lungs clear " to auscultation - no rales, rhonchi or wheezes  CV: regular rate and rhythm, normal S1 S2, no S3 or S4, no murmur, click or rub, no peripheral edema and peripheral pulses strong  MS: no gross musculoskeletal defects noted, no edema  SKIN: no suspicious lesions or rashes    Diagnostic Test Results:  none     ASSESSMENT/PLAN:   (I10) Benign essential hypertension  (primary encounter diagnosis)  Comment: Added lisinopril to his blood pressure. Asked to return in a few weeks for a recheck .   Plan: lisinopril (PRINIVIL/ZESTRIL) 10 MG tablet,         DISCONTINUED: lisinopril (PRINIVIL/ZESTRIL) 10         MG tablet            (M25.50) Multiple joint pain  Comment: Check inflammatory markers and screen for rheumatoid . Patient also requested a lyme test   Plan: ESR: Erythrocyte sedimentation rate, CRP,         inflammation, Rheumatoid factor, Lyme Disease        Lucía with reflex to WB Serum FUTURE 14d          (G44.209) Tension headache  Comment: Trial of naproxen and flexeril .   Plan: naproxen (NAPROSYN) 500 MG tablet,         cyclobenzaprine (FLEXERIL) 10 MG tablet,         DISCONTINUED: naproxen (NAPROSYN) 500 MG         tablet, DISCONTINUED: cyclobenzaprine         (FLEXERIL) 10 MG tablet              FUTURE APPOINTMENTS:       - Follow-up visit as needed    Jseusita Ferreira MD  [facility]

## 2017-11-13 NOTE — PATIENT INSTRUCTIONS
Thank you for choosing [facility].  You may be receiving a survey in the mail from Taylor Santos regarding your visit today.  Please take a few minutes to complete and return the survey to let us know how we are doing.      If you have questions or concerns, please contact us via [employer] or you can contact your care team at [phone].    Our Clinic hours are:  Monday 6:40 am  to 7:00 pm  Tuesday -Friday 6:40 am to 5:00 pm    The [place] outpatient lab hours are:  Monday - Friday 6:10 am to 4:45 pm  Saturdays 7:00 am to 11:00 am  Appointments are required, call [phone]    If you have clinical questions after hours or would like to schedule an appointment,  call the clinic at [phone].

## 2017-11-13 NOTE — MR AVS SNAPSHOT
After Visit Summary   11/13/2017    Tommy Kaba    MRN: [id number]           Patient Information     Date Of Birth          1965        Visit Information        Provider Department      11/13/2017 10:20 AM Jesusita Ferreira MD [facility]        Today's Diagnoses     Benign essential hypertension    -  1    Multiple joint pain        Tension headache          Care Instructions          Thank you for choosing [facility].  You may be receiving a survey in the mail from [facility] regarding your visit today.  Please take a few minutes to complete and return the survey to let us know how we are doing.      If you have questions or concerns, please contact us via Service Route or you can contact your care team at [phone].    Our Clinic hours are:  Monday 6:40 am  to 7:00 pm  Tuesday -Friday 6:40 am to 5:00 pm    The [place] outpatient lab hours are:  Monday - Friday 6:10 am to 4:45 pm  Saturdays 7:00 am to 11:00 am  Appointments are required, call [phone]    If you have clinical questions after hours or would like to schedule an appointment,  call the clinic at [phone].          Follow-ups after your visit        Who to contact     If you have questions or need follow up information about today's clinic visit or your schedule please contact [facility] directly at [phone].  Normal or non-critical lab and imaging results will be communicated to you by MyChart, letter or phone within 4 business days after the clinic has received the results. If you do not hear from us within 7 days, please contact the clinic through Nvidiahart or phone. If you have a critical or abnormal lab result, we will notify you by phone as soon as possible.  Submit refill requests through Service Route or call your pharmacy and they will forward the refill request to us. Please allow 3 business days for your refill to be completed.          Additional Information About Your Visit    "     MyChart Information     AmberPoint lets you send messages to your doctor, view your test results, renew your prescriptions, schedule appointments and more. To sign up, go to [email or web address] . Click on \"Log in\" on the left side of the screen, which will take you to the Welcome page. Then click on \"Sign up Now\" on the right side of the page.     You will be asked to enter the access code listed below, as well as some personal information. Please follow the directions to create your username and password.     Your access code is: [email or web address]  Expires: 2017  2:59 PM     Your access code will  in 90 days. If you need help or a new code, please call your [place] clinic or [phone].        Care EveryWhere ID     This is your Care EveryWhere ID. This could be used by other organizations to access your [place] medical records  [id number]        Your Vitals Were     Pulse Temperature Height BMI (Body Mass Index)          60 97.3  F (36.3  C) (Tympanic) 5' 9.75\" (1.772 m) 32.23 kg/m2         Blood Pressure from Last 3 Encounters:   17 (!) 145/95   11/10/17 (!) 146/96   10/02/17 131/90    Weight from Last 3 Encounters:   17 223 lb (101.2 kg)   17 224 lb (101.6 kg)   05/15/15 225 lb (102.1 kg)              We Performed the Following     **Lyme Disease Lucía with reflex to WB Serum FUTURE 14d     CRP, inflammation     ESR: Erythrocyte sedimentation rate     Rheumatoid factor          Today's Medication Changes          These changes are accurate as of: 17 10:52 AM.  If you have any questions, ask your nurse or doctor.               Start taking these medicines.        Dose/Directions    cyclobenzaprine 10 MG tablet   Commonly known as:  FLEXERIL   Used for:  Tension headache   Started by:  Jesusita Ferreira MD        Dose:  5-10 mg   Take 0.5-1 tablets (5-10 mg) by mouth 3 times daily as needed for muscle spasms   Quantity:  30 tablet   Refills:  1       lisinopril 10 " MG tablet   Commonly known as:  PRINIVIL/ZESTRIL   Used for:  Benign essential hypertension   Started by:  Jesusita Ferreira MD        Dose:  10 mg   Take 1 tablet (10 mg) by mouth daily   Quantity:  30 tablet   Refills:  6       naproxen 500 MG tablet   Commonly known as:  NAPROSYN   Used for:  Tension headache   Started by:  Jesusita Ferreira MD        Dose:  500 mg   Take 1 tablet (500 mg) by mouth 2 times daily as needed for moderate pain   Quantity:  60 tablet   Refills:  1            Where to get your medicines      These medications were sent to [facility] - [place], [place] - [address]  [address], [facility] [address]     Phone:  [phone]     cyclobenzaprine 10 MG tablet    lisinopril 10 MG tablet    naproxen 500 MG tablet                Primary Care Provider Office Phone # Fax #    Jesusita Ferreira [phone] [address] [facility] [place] [address]        Equal Access to Services     [facility] : Hadii eh ku hadasho Soomaali, waaxda luqadaha, qaybta kaalmada adeegyada, waxay pedro luisin hayjavonn cintia moncada . So [facility] [phone].    ATENCIÓN: Si habla español, tiene a holliday disposición servicios gratuitos de asistencia lingüística. Llame al [phone].    We comply with applicable federal civil rights laws and [place] laws. We do not discriminate on the basis of race, color, national origin, age, disability, sex, sexual orientation, or gender identity.            Thank you!     Thank you for choosing [facility]  for your care. Our goal is always to provide you with excellent care. Hearing back from our patients is one way we can continue to improve our services. Please take a few minutes to complete the written survey that you may receive in the mail after your visit with us. Thank you!             Your Updated Medication List - Protect others around you: Learn how to safely use, store and throw away your medicines  at www.disposemymeds.org.          This list is accurate as of: 11/13/17 10:52 AM.  Always use your most recent med list.                   Brand Name Dispense Instructions for use Diagnosis    atorvastatin 10 MG tablet    LIPITOR    90 tablet    Take 1 tablet (10 mg) by mouth daily    Hyperlipidemia LDL goal <100       cyclobenzaprine 10 MG tablet    FLEXERIL    30 tablet    Take 0.5-1 tablets (5-10 mg) by mouth 3 times daily as needed for muscle spasms    Tension headache       hydrochlorothiazide 12.5 MG Tabs tablet     60 tablet    Take 2 tablets (25 mg) by mouth daily    Benign essential hypertension       lisinopril 10 MG tablet    PRINIVIL/ZESTRIL    30 tablet    Take 1 tablet (10 mg) by mouth daily    Benign essential hypertension       naproxen 500 MG tablet    NAPROSYN    60 tablet    Take 1 tablet (500 mg) by mouth 2 times daily as needed for moderate pain    Tension headache

## 2017-11-14 LAB
B BURGDOR IGG+IGM SER QL: 0.03 (ref 0–0.89)
RHEUMATOID FACT SER NEPH-ACNC: <20 IU/ML (ref 0–20)

## 2017-11-22 ENCOUNTER — OFFICE VISIT (OUTPATIENT)
Dept: FAMILY MEDICINE | Facility: CLINIC | Age: 52
End: 2017-11-22
Payer: COMMERCIAL

## 2017-11-22 ENCOUNTER — TELEPHONE (OUTPATIENT)
Dept: FAMILY MEDICINE | Facility: CLINIC | Age: 52
End: 2017-11-22

## 2017-11-22 VITALS — DIASTOLIC BLOOD PRESSURE: 93 MMHG | SYSTOLIC BLOOD PRESSURE: 137 MMHG | HEART RATE: 63 BPM

## 2017-11-22 DIAGNOSIS — Z01.30 BP CHECK: Primary | ICD-10-CM

## 2017-11-22 DIAGNOSIS — I10 BENIGN ESSENTIAL HYPERTENSION: Primary | ICD-10-CM

## 2017-11-22 DIAGNOSIS — E78.5 HYPERLIPIDEMIA LDL GOAL <100: ICD-10-CM

## 2017-11-22 PROCEDURE — 99207 ZZC NO CHARGE NURSE ONLY: CPT

## 2017-11-22 NOTE — NURSING NOTE
Patient is following up on OV 17 for BP check  He started taking Lisinopril 17 as prescribed.  After taking for 2 days he started having bilateral arm numbness with chest pain near his heart.  He stopped taking Lisinopril 11/15/17.  He reports that his dad and brother had the same side effect on this medication.  Patient is not taking Lipitor.  He stopped taking lipitor due to side effect of a stiff neck each time he took this medications, he stopped on 17.  Patient restarted on Lipitor 17 to try it again, same result, stiff neck.  He has stopped taking this medication  Taking HCTZ twice a day, no side effects  Patient taking BP at home, 17 BP: 120/81    Girlfriend makes him eat healthy at home  Exercising 3-4 times a week, walking and weight lifting  Non smoker  Drinking alcohol Friday or Saturday nights, 4-5 beverages one of those nights a week  Patient denies headache, nausea, epigastric pain, visual changes, although he has had 2 episodes of seeing black spots/head feeling foggy in the last month    Today's BP's:  1st readin/97  2nd readin/93    Routing to provider.    Milena GONCALVES Rn

## 2017-11-22 NOTE — MR AVS SNAPSHOT
"              After Visit Summary   2017    Tommy Kaba    MRN: [id number]           Patient Information     Date Of Birth          1965        Visit Information        Provider Department      2017 9:00 AM ELEAZAR LOVE/LUCIAN LEOS [facility]        Today's Diagnoses     BP check    -  1       Follow-ups after your visit        Who to contact     If you have questions or need follow up information about today's clinic visit or your schedule please contact [facility] directly at [phone].  Normal or non-critical lab and imaging results will be communicated to you by Wantrhart, letter or phone within 4 business days after the clinic has received the results. If you do not hear from us within 7 days, please contact the clinic through Wantrhart or phone. If you have a critical or abnormal lab result, we will notify you by phone as soon as possible.  Submit refill requests through TryLife or call your pharmacy and they will forward the refill request to us. Please allow 3 business days for your refill to be completed.          Additional Information About Your Visit        MyChart Information     TryLife lets you send messages to your doctor, view your test results, renew your prescriptions, schedule appointments and more. To sign up, go to [email or web address] . Click on \"Log in\" on the left side of the screen, which will take you to the Welcome page. Then click on \"Sign up Now\" on the right side of the page.     You will be asked to enter the access code listed below, as well as some personal information. Please follow the directions to create your username and password.     Your access code is: [email or web address]  Expires: 2017  2:59 PM     Your access code will  in 90 days. If you need help or a new code, please call your [facility] or [phone].        Care EveryWhere ID     This is your Care EveryWhere ID. This could be used by other organizations to access " your [place] medical records  [id number]        Your Vitals Were     Pulse                   63            Blood Pressure from Last 3 Encounters:   11/22/17 (!) 137/93   11/13/17 (!) 146/96   11/10/17 (!) 146/96    Weight from Last 3 Encounters:   11/13/17 223 lb (101.2 kg)   05/22/17 224 lb (101.6 kg)   05/15/15 225 lb (102.1 kg)              Today, you had the following     No orders found for display         Today's Medication Changes          These changes are accurate as of: 11/22/17 10:29 AM.  If you have any questions, ask your nurse or doctor.               Stop taking these medicines if you haven't already. Please contact your care team if you have questions.     lisinopril 10 MG tablet   Commonly known as:  PRINIVIL/ZESTRIL                    Primary Care Provider Office Phone # Fax #    Jesusita Ferreira [phone] [address] [facility] [address]        Equal Access to Services     RENEE WALTON : Hadii eh bagleyo Someli, waaxda luqadaha, qaybta kaalmada ademagdaleno, vicky moncada . So [facility] [phone].    ATENCIÓN: Si habla español, tiene a holliday disposición servicios gratuitos de asistencia lingüística. Llame al [phone].    We comply with applicable federal civil rights laws and [place] laws. We do not discriminate on the basis of race, color, national origin, age, disability, sex, sexual orientation, or gender identity.            Thank you!     Thank you for choosing [facility]  for your care. Our goal is always to provide you with excellent care. Hearing back from our patients is one way we can continue to improve our services. Please take a few minutes to complete the written survey that you may receive in the mail after your visit with us. Thank you!             Your Updated Medication List - Protect others around you: Learn how to safely use, store and throw away your medicines at www.disposemymeds.org.           This list is accurate as of: 11/22/17 10:29 AM.  Always use your most recent med list.                   Brand Name Dispense Instructions for use Diagnosis    atorvastatin 10 MG tablet    LIPITOR    90 tablet    Take 1 tablet (10 mg) by mouth daily    Hyperlipidemia LDL goal <100       cyclobenzaprine 10 MG tablet    FLEXERIL    30 tablet    Take 0.5-1 tablets (5-10 mg) by mouth 3 times daily as needed for muscle spasms    Tension headache       hydrochlorothiazide 12.5 MG Tabs tablet     60 tablet    Take 2 tablets (25 mg) by mouth daily    Benign essential hypertension

## 2017-11-27 RX ORDER — ROSUVASTATIN CALCIUM 5 MG/1
5 TABLET, COATED ORAL DAILY
Qty: 30 TABLET | Refills: 3 | Status: SHIPPED | OUTPATIENT
Start: 2017-11-27 | End: 2019-03-01

## 2017-11-27 RX ORDER — AMLODIPINE BESYLATE 5 MG/1
5 TABLET ORAL DAILY
Qty: 30 TABLET | Refills: 1 | Status: SHIPPED | OUTPATIENT
Start: 2017-11-27 | End: 2018-03-09

## 2017-11-27 NOTE — TELEPHONE ENCOUNTER
I have faxed the crestor. Please inform patient that I have faxed  The crestor and also the amlodipine. Thanks

## 2017-11-27 NOTE — TELEPHONE ENCOUNTER
Patient notified.  He states his brother takes crestor for his bp.  Patient is aware crestor is a medication to lower cholesterol.    He would like to try crestor.    Routing to provider.  Moraima GONCALVES RN

## 2017-11-27 NOTE — TELEPHONE ENCOUNTER
I will add amlodipine since he had a reaction to lisinopril. He is still not at goal but quite close.

## 2018-03-09 ENCOUNTER — TELEPHONE (OUTPATIENT)
Dept: PEDIATRICS | Facility: CLINIC | Age: 53
End: 2018-03-09

## 2018-03-09 ENCOUNTER — ALLIED HEALTH/NURSE VISIT (OUTPATIENT)
Dept: FAMILY MEDICINE | Facility: CLINIC | Age: 53
End: 2018-03-09
Payer: COMMERCIAL

## 2018-03-09 VITALS — DIASTOLIC BLOOD PRESSURE: 86 MMHG | HEART RATE: 68 BPM | SYSTOLIC BLOOD PRESSURE: 127 MMHG

## 2018-03-09 DIAGNOSIS — I10 HTN (HYPERTENSION): Primary | ICD-10-CM

## 2018-03-09 DIAGNOSIS — I10 BENIGN ESSENTIAL HYPERTENSION: ICD-10-CM

## 2018-03-09 PROCEDURE — 99207 ZZC NO CHARGE NURSE ONLY: CPT

## 2018-03-09 RX ORDER — AMLODIPINE BESYLATE 5 MG/1
5 TABLET ORAL DAILY
Qty: 30 TABLET | Refills: 1 | Status: CANCELLED | OUTPATIENT
Start: 2018-03-09

## 2018-03-09 RX ORDER — AMLODIPINE BESYLATE 5 MG/1
5 TABLET ORAL DAILY
Qty: 90 TABLET | Refills: 1 | Status: SHIPPED | OUTPATIENT
Start: 2018-03-09 | End: 2018-06-22

## 2018-03-09 NOTE — MR AVS SNAPSHOT
"              After Visit Summary   3/9/2018    Tommy Kaba    MRN: [id number]           Patient Information     Date Of Birth          1965        Visit Information        Provider Department      3/9/2018 2:15 PM ELEAZAR LOVE/LUCIAN LEOS [facility]        Today's Diagnoses     HTN (hypertension)    -  1    Benign essential hypertension           Follow-ups after your visit        Who to contact     If you have questions or need follow up information about today's clinic visit or your schedule please contact [facility] directly at [phone].  Normal or non-critical lab and imaging results will be communicated to you by ScanScouthart, letter or phone within 4 business days after the clinic has received the results. If you do not hear from us within 7 days, please contact the clinic through [employer] or phone. If you have a critical or abnormal lab result, we will notify you by phone as soon as possible.  Submit refill requests through Therapeutic Monitoring Services or call your pharmacy and they will forward the refill request to us. Please allow 3 business days for your refill to be completed.          Additional Information About Your Visit        MyChart Information     The[employer] Monitoring Services lets you send messages to your doctor, view your test results, renew your prescriptions, schedule appointments and more. To sign up, go to [email or web address] . Click on \"Log in\" on the left side of the screen, which will take you to the Welcome page. Then click on \"Sign up Now\" on the right side of the page.     You will be asked to enter the access code listed below, as well as some personal information. Please follow the directions to create your username and password.     Your access code is: [email or web address]  Expires: 2018  2:16 PM     Your access code will  in 90 days. If you need help or a new code, please call your [facility] or [phone].        Care EveryWhere ID     This is your Care EveryWhere ID. This could be " used by other organizations to access your [place] medical records  [id number]        Your Vitals Were     Pulse                   68            Blood Pressure from Last 3 Encounters:   03/09/18 127/86   11/22/17 (!) 137/93   11/13/17 (!) 146/96    Weight from Last 3 Encounters:   11/13/17 223 lb (101.2 kg)   05/22/17 224 lb (101.6 kg)   05/15/15 225 lb (102.1 kg)              Today, you had the following     No orders found for display         Where to get your medicines      These medications were sent to [facility] [place], [place] - [address]  [address] [address]     Phone:  [phone]     amLODIPine 5 MG tablet          Primary Care Provider Office Phone # Fax #    Jesusita Ferreira [phone]       [address] [facility] [address]        Equal Access to Services     RENEE WALTON : Hadii eh ku hadasho Soomaali, waaxda luqadaha, qaybta kaalmada adeegyada, vicky perez hayvinny moncada . So [facility] [phone].    ATENCIÓN: Si habla español, tiene a holliday disposición servicios gratuitos de asistencia lingüística. Llame al [phone].    We comply with applicable federal civil rights laws and [place] laws. We do not discriminate on the basis of race, color, national origin, age, disability, sex, sexual orientation, or gender identity.            Thank you!     Thank you for choosing [facility]  for your care. Our goal is always to provide you with excellent care. Hearing back from our patients is one way we can continue to improve our services. Please take a few minutes to complete the written survey that you may receive in the mail after your visit with us. Thank you!             Your Updated Medication List - Protect others around you: Learn how to safely use, store and throw away your medicines at www.disposemymeds.org.          This list is accurate as of 3/9/18  2:16 PM.  Always use your most recent med list.                    Brand Name Dispense Instructions for use Diagnosis    amLODIPine 5 MG tablet    NORVASC    90 tablet    Take 1 tablet (5 mg) by mouth daily    Benign essential hypertension       atorvastatin 10 MG tablet    LIPITOR    90 tablet    Take 1 tablet (10 mg) by mouth daily    Hyperlipidemia LDL goal <100       cyclobenzaprine 10 MG tablet    FLEXERIL    30 tablet    Take 0.5-1 tablets (5-10 mg) by mouth 3 times daily as needed for muscle spasms    Tension headache       hydrochlorothiazide 12.5 MG Tabs tablet     60 tablet    Take 2 tablets (25 mg) by mouth daily    Benign essential hypertension       rosuvastatin 5 MG tablet    CRESTOR    30 tablet    Take 1 tablet (5 mg) by mouth daily    Hyperlipidemia LDL goal <100

## 2018-03-09 NOTE — TELEPHONE ENCOUNTER
Patient called he is currently out of medication.     Norvasc  Last Written Prescription Date:  11/27/17  Last Fill Quantity: 30,  # refills: 1   Last office visit: No previous visit found with prescribing provider:  11/13/2017  Future Office Visit:      Lolita Rosario

## 2018-03-09 NOTE — NURSING NOTE
S-(situation): Patient in clinic today for bp check.  Taking amlodipine 5mg daily for bp control.  Denies s/s of high or low bp.  STates he monitors his sodium intake and gets plenty of physical activity.    B-(background): Blood pressures in clinic:  1st reading = 144/94, HR 70  2nd reading = 127/86, HR 68    A-(assessment): Bp at goal.    R-(recommendations): Please continue med regimen regular exercise and healthy eating habits.  Amlodipine refill sent.    Moraima GONCALVES RN

## 2018-06-22 ENCOUNTER — OFFICE VISIT (OUTPATIENT)
Dept: INTERNAL MEDICINE | Facility: CLINIC | Age: 53
End: 2018-06-22
Payer: COMMERCIAL

## 2018-06-22 ENCOUNTER — HOSPITAL ENCOUNTER (OUTPATIENT)
Dept: CT IMAGING | Facility: CLINIC | Age: 53
Discharge: HOME | End: 2018-06-22
Attending: INTERNAL MEDICINE | Admitting: INTERNAL MEDICINE
Payer: COMMERCIAL

## 2018-06-22 VITALS
OXYGEN SATURATION: 98 % | BODY MASS INDEX: 31.21 KG/M2 | WEIGHT: 218 LBS | HEIGHT: 70 IN | SYSTOLIC BLOOD PRESSURE: 154 MMHG | TEMPERATURE: 96.8 F | DIASTOLIC BLOOD PRESSURE: 90 MMHG | HEART RATE: 54 BPM

## 2018-06-22 DIAGNOSIS — H93.13 RINGING IN EAR, BILATERAL: ICD-10-CM

## 2018-06-22 DIAGNOSIS — G44.209 TENSION HEADACHE: ICD-10-CM

## 2018-06-22 DIAGNOSIS — I10 BENIGN ESSENTIAL HYPERTENSION: Primary | ICD-10-CM

## 2018-06-22 PROCEDURE — 70450 CT HEAD/BRAIN W/O DYE: CPT

## 2018-06-22 PROCEDURE — 99214 OFFICE O/P EST MOD 30 MIN: CPT | Performed by: INTERNAL MEDICINE

## 2018-06-22 RX ORDER — AMLODIPINE BESYLATE 5 MG/1
5 TABLET ORAL DAILY
Qty: 90 TABLET | Refills: 1 | Status: SHIPPED | OUTPATIENT
Start: 2018-06-22 | End: 2019-01-02

## 2018-06-22 ASSESSMENT — PAIN SCALES - GENERAL: PAINLEVEL: MODERATE PAIN (4)

## 2018-06-22 NOTE — PROGRESS NOTES
"  SUBJECTIVE:   Tommy Kaba is a 53 year old male who presents to clinic today for the following health issues:      Chief Complaint   Patient presents with     Otalgia     4 years     Pain     top of head, every day      Had skin reactions and takes zyrtec and that helps.      Now has headaches and ear pain, left is worse and sharp pain once on the right side.  No change in hearing, some ringing as well.      Not taking his meds, doing diet and no salt, weight is down.  Says bp is 120/80 at home.     Past Medical History:   Diagnosis Date     Nephrolithiasis 11/2013     Pleurisy 12/2013     Current Outpatient Prescriptions   Medication     Cetirizine HCl (ZYRTEC PO)     IBUPROFEN PO     amLODIPine (NORVASC) 5 MG tablet     atorvastatin (LIPITOR) 10 MG tablet     cyclobenzaprine (FLEXERIL) 10 MG tablet     hydrochlorothiazide 12.5 MG TABS tablet     rosuvastatin (CRESTOR) 5 MG tablet     No current facility-administered medications for this visit.      Social History   Substance Use Topics     Smoking status: Never Smoker     Smokeless tobacco: Never Used     Alcohol use 1.0 oz/week     2 Standard drinks or equivalent per week       Review of Systems  Constitutional-No fevers, chills, or weight changes..  ENT-ringing in ears, left is worse.  Cardiac-No chest pain or palpitations.  Respiratory-No cough, sob, or hemoptysis.  GI-No nausea, vomitting, diarrhea, constipation, or blood in the stool.  Neuro-No numbness, tingling, or weakness.    Physical Exam  /90  Pulse 54  Temp 96.8  F (36  C) (Temporal)  Ht 5' 9.5\" (1.765 m)  Wt 218 lb (98.9 kg)  SpO2 98%  BMI 31.73 kg/m2  General Appearance-healthy, alert, no distress  ENT-ENT exam normal, no neck nodes or sinus tenderness, no carotid bruits  Cardiac-regular rate and rhythm  with normal S1, S2 ; no murmur, rub or gallops  Lungs-clear to auscultation    ASSESSMENT:  53-year-old gentleman who has a history of hypertension.  He has lost some weight and is " watching salt but he stopped his blood pressure pills.  His blood pressures quite high at 154/90 today.  He is also having more headaches, atypical head pain and ear pain.  His exam is benign with his ears being clear, his neck is supple and nontender, no carotid bruits, heart is regular.    Do the headaches and atypical symptoms we will get a head CT on him.  I recommended he get a new blood pressure medication and at least restart the amlodipine.  He has to watch his cuff at home as this may be inaccurate.  We will also send him to audiology for a true hearing evaluation with the ringing in normal sensations.  Electronically signed by Aquiles Daley MD

## 2018-06-22 NOTE — MR AVS SNAPSHOT
"              After Visit Summary   2018    Tommy Kaba    MRN: [id number]           Patient Information     Date Of Birth          1965        Visit Information        Provider Department      2018 10:00 AM Aquiles Daley MD [facility]         Follow-ups after your visit        Who to contact     If you have questions or need follow up information about today's clinic visit or your schedule please contact [facility] directly at [phone].  Normal or non-critical lab and imaging results will be communicated to you by [employer], letter or phone within 4 business days after the clinic has received the results. If you do not hear from us within 7 days, please contact the clinic through [employer] or phone. If you have a critical or abnormal lab result, we will notify you by phone as soon as possible.  Submit refill requests through [employer] or call your pharmacy and they will forward the refill request to us. Please allow 3 business days for your refill to be completed.          Additional Information About Your Visit        [employer] Information     [employer] lets you send messages to your doctor, view your test results, renew your prescriptions, schedule appointments and more. To sign up, go to [email or web address] . Click on \"Log in\" on the left side of the screen, which will take you to the Welcome page. Then click on \"Sign up Now\" on the right side of the page.     You will be asked to enter the access code listed below, as well as some personal information. Please follow the directions to create your username and password.     Your access code is: [email or web address]  Expires: 2018 10:18 AM     Your access code will  in 90 days. If you need help or a new code, please call your [facility] or [phone].        Care EveryWhere ID     This is your Care EveryWhere ID. This could be used by other organizations to access your [place] medical " "records  [id number]        Your Vitals Were     Pulse Temperature Height Pulse Oximetry BMI (Body Mass Index)       54 96.8  F (36  C) (Temporal) 5' 9.5\" (1.765 m) 98% 31.73 kg/m2        Blood Pressure from Last 3 Encounters:   06/22/18 154/90   03/09/18 127/86   11/22/17 (!) 137/93    Weight from Last 3 Encounters:   06/22/18 218 lb (98.9 kg)   11/13/17 223 lb (101.2 kg)   05/22/17 224 lb (101.6 kg)              Today, you had the following     No orders found for display       Primary Care Provider Office Phone # Fax #    Jesusita Ferreira [phone] [address] [facility] [address]        Equal Access to Services     RENEE WALTON : Hadii eh torres Someli, waaxda luqadaha, qaybta kaalmada adejohnyada, vicky moncada . So [facility] [phone].    ATENCIÓN: Si habla español, tiene a holliday disposición servicios gratuitos de asistencia lingüística. Igorame al [phone].    We comply with applicable federal civil rights laws and [place] laws. We do not discriminate on the basis of race, color, national origin, age, disability, sex, sexual orientation, or gender identity.            Thank you!     Thank you for choosing [facility]  for your care. Our goal is always to provide you with excellent care. Hearing back from our patients is one way we can continue to improve our services. Please take a few minutes to complete the written survey that you may receive in the mail after your visit with us. Thank you!             Your Updated Medication List - Protect others around you: Learn how to safely use, store and throw away your medicines at www.disposemymeds.org.          This list is accurate as of 6/22/18 10:18 AM.  Always use your most recent med list.                   Brand Name Dispense Instructions for use Diagnosis    amLODIPine 5 MG tablet    NORVASC    90 tablet    Take 1 tablet (5 mg) by mouth daily    Benign essential hypertension "       atorvastatin 10 MG tablet    LIPITOR    90 tablet    Take 1 tablet (10 mg) by mouth daily    Hyperlipidemia LDL goal <100       cyclobenzaprine 10 MG tablet    FLEXERIL    30 tablet    Take 0.5-1 tablets (5-10 mg) by mouth 3 times daily as needed for muscle spasms    Tension headache       hydrochlorothiazide 12.5 MG Tabs tablet     60 tablet    Take 2 tablets (25 mg) by mouth daily    Benign essential hypertension       IBUPROFEN PO           rosuvastatin 5 MG tablet    CRESTOR    30 tablet    Take 1 tablet (5 mg) by mouth daily    Hyperlipidemia LDL goal <100       ZYRTEC PO

## 2018-07-10 ENCOUNTER — OFFICE VISIT (OUTPATIENT)
Dept: AUDIOLOGY | Facility: CLINIC | Age: 53
End: 2018-07-10
Payer: COMMERCIAL

## 2018-07-10 DIAGNOSIS — H90.3 SENSORINEURAL HEARING LOSS, BILATERAL: Primary | ICD-10-CM

## 2018-07-10 PROCEDURE — 92557 COMPREHENSIVE HEARING TEST: CPT | Performed by: AUDIOLOGIST

## 2018-07-10 PROCEDURE — 99207 ZZC NO CHARGE LOS: CPT | Performed by: AUDIOLOGIST

## 2018-07-10 PROCEDURE — 92550 TYMPANOMETRY & REFLEX THRESH: CPT | Performed by: AUDIOLOGIST

## 2018-07-10 NOTE — MR AVS SNAPSHOT
"              After Visit Summary   7/10/2018    Tommy Kaba    MRN: [id number]           Patient Information     Date Of Birth          1965        Visit Information        Provider Department      7/10/2018 11:00 AM Holly Rogers AuD [facility]        Today's Diagnoses     Sensorineural hearing loss, bilateral    -  1       Follow-ups after your visit        Your next 10 appointments already scheduled     Aug 20, 2018 10:30 AM CDT   New Visit with Sathish Jack MD   [facility]    [address] [place] [address] [id number] [phone]              Who to contact     If you have questions or need follow up information about today's clinic visit or your schedule please contact [facility] directly at [phone].  Normal or non-critical lab and imaging results will be communicated to you by [employer], letter or phone within 4 business days after the clinic has received the results. If you do not hear from us within 7 days, please contact the clinic through MyChart or phone. If you have a critical or abnormal lab result, we will notify you by phone as soon as possible.  Submit refill requests through [employer] or call your pharmacy and they will forward the refill request to us. Please allow 3 business days for your refill to be completed.          Additional Information About Your Visit        MyChart Information     [employer] lets you send messages to your doctor, view your test results, renew your prescriptions, schedule appointments and more. To sign up, go to [email or web address] . Click on \"Log in\" on the left side of the screen, which will take you to the Welcome page. Then click on \"Sign up Now\" on the right side of the page.     You will be asked to enter the access code listed below, as well as some personal information. Please follow the directions to create your username and password.     Your access code " is: [email or web address]  Expires: 2018 10:18 AM     Your access code will  in 90 days. If you need help or a new code, please call your [place] clinic or [phone].        Care EveryWhere ID     This is your Care EveryWhere ID. This could be used by other organizations to access your [place] medical records  [id number]         Blood Pressure from Last 3 Encounters:   18 154/90   18 127/86   17 (!) 137/93    Weight from Last 3 Encounters:   18 218 lb (98.9 kg)   17 223 lb (101.2 kg)   17 224 lb (101.6 kg)              We Performed the Following     AUDIOGRAM/TYMPANOGRAM - INTERFACE     COMPREHENSIVE HEARING TEST     TYMPANOMETRY AND REFLEX THRESHOLD MEASUREMENTS        Primary Care Provider Office Phone # Fax #    Bennyin Ernesto Ferreira [phone] [address] [address]        Equal Access to Services     NAMRATA WALTON : Hadii aad ku hadasho Soomaali, waaxda luqadaha, qaybta kaalmada adeegyada, waxay ana hayjavonn cintia moncada . So [facility] [phone].    ATENCIÓN: Si habla español, tiene a holliday disposición servicios gratuitos de asistencia lingüística. Llame al [phone].    We comply with applicable federal civil rights laws and [place] laws. We do not discriminate on the basis of race, color, national origin, age, disability, sex, sexual orientation, or gender identity.            Thank you!     Thank you for choosing [facility]  for your care. Our goal is always to provide you with excellent care. Hearing back from our patients is one way we can continue to improve our services. Please take a few minutes to complete the written survey that you may receive in the mail after your visit with us. Thank you!             Your Updated Medication List - Protect others around you: Learn how to safely use, store and throw away your medicines at www.disposemymeds.org.          This list is accurate as of 7/10/18   2:01 PM.  Always use your most recent med list.                   Brand Name Dispense Instructions for use Diagnosis    amLODIPine 5 MG tablet    NORVASC    90 tablet    Take 1 tablet (5 mg) by mouth daily    Benign essential hypertension       atorvastatin 10 MG tablet    LIPITOR    90 tablet    Take 1 tablet (10 mg) by mouth daily    Hyperlipidemia LDL goal <100       cyclobenzaprine 10 MG tablet    FLEXERIL    30 tablet    Take 0.5-1 tablets (5-10 mg) by mouth 3 times daily as needed for muscle spasms    Tension headache       hydrochlorothiazide 12.5 MG Tabs tablet     60 tablet    Take 2 tablets (25 mg) by mouth daily    Benign essential hypertension       IBUPROFEN PO           rosuvastatin 5 MG tablet    CRESTOR    30 tablet    Take 1 tablet (5 mg) by mouth daily    Hyperlipidemia LDL goal <100       ZYRTEC PO

## 2018-07-10 NOTE — PROGRESS NOTES
AUDIOLOGY REPORT    SUBJECTIVE:  Tommy Kaba is a 53 year old male who was seen in the Audiology Clinic at the [facility] for audiologic evaluation, referred by MYRNA Daley M.D. The patient reports ongoing ear pain (head pain), ear popping/snapping, and hears/feels breathing in his ears. He reported bilateral tinnitus that changes in pitch and history of noise exposure.  The patient denies  bilateral drainage and vertigo.  The patient notes overall hearing is okay, he reported he actually hears low frequencies very well and is bothered by the noise generated by the transformer in his [facility].  They were accompanied today by their self.    OBJECTIVE:  Otoscopic exam indicates ears are clear of cerumen bilaterally     Pure Tone Thresholds assessed using conventional audiometry with good  reliability from 250-8000 Hz bilaterally using insert earphones     RIGHT:  normal, mild and moderate sensorineural hearing loss    LEFT:    mild rising to normal to mild to moderate sensorineural hearing loss    Tympanogram:    RIGHT: normal eardrum mobility    LEFT:   normal eardrum mobility    Reflexes (reported by stimulus ear):  RIGHT: Ipsilateral is present at normal levels  RIGHT: Contralateral is present at normal levels  LEFT:   Ipsilateral is present at normal levels  LEFT:   Contralateral is present at normal levels      Speech Reception Threshold:    RIGHT: 10 dB HL    LEFT:   15 dB HL  Word Recognition Score:     RIGHT: 100% at 50 dB HL using NU-6 recorded word list.    LEFT:   100% at 65 dB HL using NU-6 recorded word list.      ASSESSMENT:   Sensorineural hearing loss bilaterally, left worse than right 125 Hz to 500 Hz.    Today s results were discussed with the patient in detail.     PLAN:  Patient was counseled regarding hearing loss and impact on communication.  It is recommended that the patient schedule an ENT consultation given ongoing ear pain and low  frequency asymmetry.  Please call this clinic with questions regarding these results or recommendations.        Judah Thorpe.  [place] Licensed Audiologist #[id number]

## 2018-08-20 ENCOUNTER — OFFICE VISIT (OUTPATIENT)
Dept: OTOLARYNGOLOGY | Facility: CLINIC | Age: 53
End: 2018-08-20
Payer: COMMERCIAL

## 2018-08-20 VITALS
SYSTOLIC BLOOD PRESSURE: 120 MMHG | OXYGEN SATURATION: 96 % | HEART RATE: 68 BPM | WEIGHT: 227 LBS | DIASTOLIC BLOOD PRESSURE: 82 MMHG | BODY MASS INDEX: 33.04 KG/M2

## 2018-08-20 DIAGNOSIS — M54.2 CERVICALGIA: ICD-10-CM

## 2018-08-20 DIAGNOSIS — H90.3 SENSORINEURAL HEARING LOSS (SNHL) OF BOTH EARS: Primary | ICD-10-CM

## 2018-08-20 DIAGNOSIS — H69.93 DYSFUNCTION OF BOTH EUSTACHIAN TUBES: ICD-10-CM

## 2018-08-20 PROCEDURE — 99203 OFFICE O/P NEW LOW 30 MIN: CPT | Performed by: OTOLARYNGOLOGY

## 2018-08-20 NOTE — PROGRESS NOTES
ENT Consultation    Tommy Kaba who is a 53 year old male seen in consultation at the request of Nathalia Raza      History of Present Illness - Tommy Kaba is a 53 year old male here today for humming and sensitivity to pressure in ears. Patient reports that about 8 years ago [place]. After that he has had the sensation that he is breathing through his ears when he breaths in through his nose. The flight home was bothersome. Popping does improve things. No significant reverberation. Nasal breathing is normal. No vertigo noted. He does experience tinnitus intermittently. Patient has had neck issues in the past. He is a albert and works in construction. Patient tries to wear hearing protection all the time.       Body mass index is 33.04 kg/(m^2).    Weight management plan: Patient was referred to their PCP to discuss a diet and exercise plan.    BP Readings from Last 1 Encounters:   06/22/18 154/90     BP noted to be well controlled today in office.     Tommy IS NOT a smoker/uses chewing tobacco.      Past Medical History -   Past Medical History:   Diagnosis Date     Nephrolithiasis 11/2013     Pleurisy 12/2013       Current Medications -   Current Outpatient Prescriptions:      amLODIPine (NORVASC) 5 MG tablet, Take 1 tablet (5 mg) by mouth daily, Disp: 90 tablet, Rfl: 1     atorvastatin (LIPITOR) 10 MG tablet, Take 1 tablet (10 mg) by mouth daily (Patient not taking: Reported on 11/22/2017), Disp: 90 tablet, Rfl: 1     Cetirizine HCl (ZYRTEC PO), , Disp: , Rfl:      cyclobenzaprine (FLEXERIL) 10 MG tablet, Take 0.5-1 tablets (5-10 mg) by mouth 3 times daily as needed for muscle spasms (Patient not taking: Reported on 11/22/2017), Disp: 30 tablet, Rfl: 1     hydrochlorothiazide 12.5 MG TABS tablet, Take 2 tablets (25 mg) by mouth daily (Patient not taking: Reported on 6/22/2018), Disp: 60 tablet, Rfl: 6     IBUPROFEN PO, , Disp: , Rfl:      rosuvastatin (CRESTOR) 5 MG tablet,  Take 1 tablet (5 mg) by mouth daily (Patient not taking: Reported on 6/22/2018), Disp: 30 tablet, Rfl: 3    Allergies -   Allergies   Allergen Reactions     Doxycycline Hives       Social History -   Social History     Social History     Marital status: Single     Spouse name: N/A     Number of children: N/A     Years of education: N/A     Social History Main Topics     Smoking status: Never Smoker     Smokeless tobacco: Never Used     Alcohol use 1.0 oz/week     2 Standard drinks or equivalent per week     Drug use: No     Sexual activity: Not on file     Other Topics Concern     Not on file     Social History Narrative       Family History -   Family History   Problem Relation Age of Onset     Genitourinary Problems Mother      nephrolithiasis     Prostate Cancer Father 76       Review of Systems - As per HPI and PMHx, otherwise review of system review of the head and neck negative.    Physical Exam  There were no vitals taken for this visit.  BMI: There is no height or weight on file to calculate BMI.    General - The patient is well nourished and well developed, and appears to have good nutritional status.  Alert and oriented to person and place, answers questions and cooperates with examination appropriately.    SKIN - No suspicious lesions or rashes.  Respiration - No respiratory distress.  Head and Face - Normocephalic and atraumatic, with no gross asymmetry noted of the contour of the facial features.  The facial nerve is intact, with strong symmetric movements.    Voice and Breathing - The patient was breathing comfortably without the use of accessory muscles. The patients voice was clear and strong, and had appropriate pitch and quality.    Ears - Bilateral pinna and EACs with normal appearing overlying skin. Tympanic membrane intact with good mobility on pneumatic otoscopy bilaterally. Bony landmarks of the ossicular chain are normal. The tympanic membranes are normal in appearance. No retraction,  perforation, or masses.  No fluid or purulence was seen in the external canal or the middle ear.     Eyes - Extraocular movements intact.  Sclera were not icteric or injected, conjunctiva were pink and moist.    Mouth - Examination of the oral cavity showed pink, healthy oral mucosa. No lesions or ulcerations noted.  The tongue was mobile and midline, and the dentition were in good condition.      Throat - The walls of the oropharynx were smooth, pink, moist, symmetric, and had no lesions or ulcerations.  The tonsillar pillars and soft palate were symmetric.  The uvula was midline on elevation.    Neck - Normal midline excursion of the laryngotracheal complex during swallowing.  Full range of motion on passive movement.  Palpation of the occipital, submental, submandibular, internal jugular chain, and supraclavicular nodes did not demonstrate any abnormal lymph nodes or masses.  The carotid pulse was palpable bilaterally.  Palpation of the thyroid was soft and smooth, with no nodules or goiter appreciated.  The trachea was mobile and midline.    Nose - External contour is symmetric, no gross deflection or scars.  Nasal mucosa is pink and moist with no abnormal mucus.  The septum was midline and non-obstructive, turbinates enalarged turbinates.  No polyps, masses, or purulence noted on examination.    Neuro - Nonfocal neuro exam is normal, CN 2 through 12 intact, normal gait and muscle tone.      Performed in clinic today:  Audiologic Studies - An audiogram and tympanogram were performed today as part of the evaluation and personally reviewed. The tympanogram shows Type A curves on the right and Type A curves on the left, with normal canal volumes and middle ear pressures.  The audiogram showed mild high frequency hearing loss on the right and mild hearing loss at low and high frequency on the left.        A/P - Tommyjohnathan Kaba is a 53 year old male with SNHL bilaterally, but worse in the left than right. I  recommended nasal saline use once a day. He should recheck the hearing in 1 year. Patient's significant other specializes in massage therapy, I suggested that he have her give him a neck/shoulder massage once a week for the next 4-5 weeks.       This document serves as a record of the services and decisions personally performed and made by Dr. Sathish Jack MD. It was created on his behalf by Arabella Winter, a trained medical scribe. The creation of this document is based the provider's statements to the medical scribe.  Arabella Winter 8/20/2018    Provider:   The information in this document, created by the medical scribe for me, accurately reflects the services I personally performed and the decisions made by me. I have reviewed and approved this document for accuracy prior to leaving the patient care area.  Dr. Sathish Jack MD 8/20/2018    Sathish Jack MD.

## 2018-08-20 NOTE — LETTER
8/20/2018         RE: Tommy Kaba  [address]  [place] [address]        Dear Colleague,    Thank you for referring your patient, Tommy Kaba, to the [facility]. Please see a copy of my visit note below.    ENT Consultation    Tommy Kaba who is a 53 year old male seen in consultation at the request of Nathalia Raza      History of Present Illness - Tommy Kaba is a 53 year old male here today for humming and sensitivity to pressure in ears. Patient reports that about 8 years ago he was diving a lot in [place] EXTENDED STAYS. After that he has had the sensation that he is breathing through his ears when he breaths in through his nose. The flight home was bothersome. Popping does improve things. No significant reverberation. Nasal breathing is normal. No vertigo noted. He does experience tinnitus intermittently. Patient has had neck issues in the past. He is a albert and works in construction. Patient tries to wear hearing protection all the time.       Body mass index is 33.04 kg/(m^2).    Weight management plan: Patient was referred to their PCP to discuss a diet and exercise plan.    BP Readings from Last 1 Encounters:   06/22/18 154/90     BP noted to be well controlled today in office.     Tommy IS NOT a smoker/uses chewing tobacco.      Past Medical History -   Past Medical History:   Diagnosis Date     Nephrolithiasis 11/2013     Pleurisy 12/2013       Current Medications -   Current Outpatient Prescriptions:      amLODIPine (NORVASC) 5 MG tablet, Take 1 tablet (5 mg) by mouth daily, Disp: 90 tablet, Rfl: 1     atorvastatin (LIPITOR) 10 MG tablet, Take 1 tablet (10 mg) by mouth daily (Patient not taking: Reported on 11/22/2017), Disp: 90 tablet, Rfl: 1     Cetirizine HCl (ZYRTEC PO), , Disp: , Rfl:      cyclobenzaprine (FLEXERIL) 10 MG tablet, Take 0.5-1 tablets (5-10 mg) by mouth 3 times daily as needed for muscle spasms (Patient not taking: Reported on 11/22/2017), Disp: 30  tablet, Rfl: 1     hydrochlorothiazide 12.5 MG TABS tablet, Take 2 tablets (25 mg) by mouth daily (Patient not taking: Reported on 6/22/2018), Disp: 60 tablet, Rfl: 6     IBUPROFEN PO, , Disp: , Rfl:      rosuvastatin (CRESTOR) 5 MG tablet, Take 1 tablet (5 mg) by mouth daily (Patient not taking: Reported on 6/22/2018), Disp: 30 tablet, Rfl: 3    Allergies -   Allergies   Allergen Reactions     Doxycycline Hives       Social History -   Social History     Social History     Marital status: Single     Spouse name: N/A     Number of children: N/A     Years of education: N/A     Social History Main Topics     Smoking status: Never Smoker     Smokeless tobacco: Never Used     Alcohol use 1.0 oz/week     2 Standard drinks or equivalent per week     Drug use: No     Sexual activity: Not on file     Other Topics Concern     Not on file     Social History Narrative       Family History -   Family History   Problem Relation Age of Onset     Genitourinary Problems Mother      nephrolithiasis     Prostate Cancer Father 76       Review of Systems - As per HPI and PMHx, otherwise review of system review of the head and neck negative.    Physical Exam  There were no vitals taken for this visit.  BMI: There is no height or weight on file to calculate BMI.    General - The patient is well nourished and well developed, and appears to have good nutritional status.  Alert and oriented to person and place, answers questions and cooperates with examination appropriately.    SKIN - No suspicious lesions or rashes.  Respiration - No respiratory distress.  Head and Face - Normocephalic and atraumatic, with no gross asymmetry noted of the contour of the facial features.  The facial nerve is intact, with strong symmetric movements.    Voice and Breathing - The patient was breathing comfortably without the use of accessory muscles. The patients voice was clear and strong, and had appropriate pitch and quality.    Ears - Bilateral pinna and  EACs with normal appearing overlying skin. Tympanic membrane intact with good mobility on pneumatic otoscopy bilaterally. Bony landmarks of the ossicular chain are normal. The tympanic membranes are normal in appearance. No retraction, perforation, or masses.  No fluid or purulence was seen in the external canal or the middle ear.     Eyes - Extraocular movements intact.  Sclera were not icteric or injected, conjunctiva were pink and moist.    Mouth - Examination of the oral cavity showed pink, healthy oral mucosa. No lesions or ulcerations noted.  The tongue was mobile and midline, and the dentition were in good condition.      Throat - The walls of the oropharynx were smooth, pink, moist, symmetric, and had no lesions or ulcerations.  The tonsillar pillars and soft palate were symmetric.  The uvula was midline on elevation.    Neck - Normal midline excursion of the laryngotracheal complex during swallowing.  Full range of motion on passive movement.  Palpation of the occipital, submental, submandibular, internal jugular chain, and supraclavicular nodes did not demonstrate any abnormal lymph nodes or masses.  The carotid pulse was palpable bilaterally.  Palpation of the thyroid was soft and smooth, with no nodules or goiter appreciated.  The trachea was mobile and midline.    Nose - External contour is symmetric, no gross deflection or scars.  Nasal mucosa is pink and moist with no abnormal mucus.  The septum was midline and non-obstructive, turbinates enalarged turbinates.  No polyps, masses, or purulence noted on examination.    Neuro - Nonfocal neuro exam is normal, CN 2 through 12 intact, normal gait and muscle tone.      Performed in clinic today:  Audiologic Studies - An audiogram and tympanogram were performed today as part of the evaluation and personally reviewed. The tympanogram shows Type A curves on the right and Type A curves on the left, with normal canal volumes and middle ear pressures.  The  audiogram showed mild high frequency hearing loss on the right and mild hearing loss at low and high frequency on the left.        A/P - Tommy Kaba is a 53 year old male with SNHL bilaterally, but worse in the left than right. I recommended nasal saline use once a day. He should recheck the hearing in 1 year. Patient's significant other specializes in massage therapy, I suggested that he have her give him a neck/shoulder massage once a week for the next 4-5 weeks.       This document serves as a record of the services and decisions personally performed and made by Dr. Sathish Jack MD. It was created on his behalf by Arabella Winter, a trained medical scribe. The creation of this document is based the provider's statements to the medical scribe.  Arabella Winter 8/20/2018    Provider:   The information in this document, created by the medical scribe for me, accurately reflects the services I personally performed and the decisions made by me. I have reviewed and approved this document for accuracy prior to leaving the patient care area.  Dr. Sathish Jack MD 8/20/2018    Sathish Jack MD.      Again, thank you for allowing me to participate in the care of your patient.        Sincerely,        Sathish Jack MD, MD

## 2019-03-01 ENCOUNTER — OFFICE VISIT (OUTPATIENT)
Dept: INTERNAL MEDICINE | Facility: CLINIC | Age: 54
End: 2019-03-01
Payer: COMMERCIAL

## 2019-03-01 ENCOUNTER — TELEPHONE (OUTPATIENT)
Dept: INTERNAL MEDICINE | Facility: CLINIC | Age: 54
End: 2019-03-01

## 2019-03-01 VITALS
BODY MASS INDEX: 32.5 KG/M2 | RESPIRATION RATE: 16 BRPM | HEART RATE: 64 BPM | OXYGEN SATURATION: 97 % | TEMPERATURE: 97.3 F | HEIGHT: 70 IN | WEIGHT: 227 LBS | SYSTOLIC BLOOD PRESSURE: 136 MMHG | DIASTOLIC BLOOD PRESSURE: 88 MMHG

## 2019-03-01 DIAGNOSIS — L29.9 ITCHING: ICD-10-CM

## 2019-03-01 DIAGNOSIS — E78.5 HYPERLIPIDEMIA LDL GOAL <100: Primary | ICD-10-CM

## 2019-03-01 DIAGNOSIS — Z13.6 CARDIOVASCULAR SCREENING; LDL GOAL LESS THAN 130: ICD-10-CM

## 2019-03-01 DIAGNOSIS — Z00.00 ENCOUNTER FOR ROUTINE ADULT HEALTH EXAMINATION WITHOUT ABNORMAL FINDINGS: Primary | ICD-10-CM

## 2019-03-01 DIAGNOSIS — I10 BENIGN ESSENTIAL HYPERTENSION: ICD-10-CM

## 2019-03-01 DIAGNOSIS — R07.9 CHEST PAIN, UNSPECIFIED TYPE: ICD-10-CM

## 2019-03-01 DIAGNOSIS — Z12.5 SCREENING FOR PROSTATE CANCER: ICD-10-CM

## 2019-03-01 LAB
ALBUMIN SERPL-MCNC: 3.8 G/DL (ref 3.4–5)
ALP SERPL-CCNC: 71 U/L (ref 40–150)
ALT SERPL W P-5'-P-CCNC: 49 U/L (ref 0–70)
ANION GAP SERPL CALCULATED.3IONS-SCNC: 4 MMOL/L (ref 3–14)
AST SERPL W P-5'-P-CCNC: 28 U/L (ref 0–45)
BILIRUB SERPL-MCNC: 0.4 MG/DL (ref 0.2–1.3)
BUN SERPL-MCNC: 12 MG/DL (ref 7–30)
CALCIUM SERPL-MCNC: 8.6 MG/DL (ref 8.5–10.1)
CHLORIDE SERPL-SCNC: 108 MMOL/L (ref 94–109)
CHOLEST SERPL-MCNC: 261 MG/DL
CO2 SERPL-SCNC: 29 MMOL/L (ref 20–32)
CREAT SERPL-MCNC: 1.17 MG/DL (ref 0.66–1.25)
CRP SERPL-MCNC: <2.9 MG/L (ref 0–8)
ERYTHROCYTE [DISTWIDTH] IN BLOOD BY AUTOMATED COUNT: 12.9 % (ref 10–15)
ERYTHROCYTE [SEDIMENTATION RATE] IN BLOOD BY WESTERGREN METHOD: 8 MM/H (ref 0–20)
GFR SERPL CREATININE-BSD FRML MDRD: 70 ML/MIN/{1.73_M2}
GLUCOSE SERPL-MCNC: 99 MG/DL (ref 70–99)
HCT VFR BLD AUTO: 46.3 % (ref 40–53)
HDLC SERPL-MCNC: 50 MG/DL
HGB BLD-MCNC: 15.2 G/DL (ref 13.3–17.7)
LDLC SERPL CALC-MCNC: 190 MG/DL
MCH RBC QN AUTO: 28.1 PG (ref 26.5–33)
MCHC RBC AUTO-ENTMCNC: 32.8 G/DL (ref 31.5–36.5)
MCV RBC AUTO: 86 FL (ref 78–100)
NONHDLC SERPL-MCNC: 211 MG/DL
PLATELET # BLD AUTO: 214 10E9/L (ref 150–450)
POTASSIUM SERPL-SCNC: 4 MMOL/L (ref 3.4–5.3)
PROT SERPL-MCNC: 7.5 G/DL (ref 6.8–8.8)
PSA SERPL-ACNC: 0.78 UG/L (ref 0–4)
RBC # BLD AUTO: 5.41 10E12/L (ref 4.4–5.9)
SODIUM SERPL-SCNC: 141 MMOL/L (ref 133–144)
TRIGL SERPL-MCNC: 103 MG/DL
TSH SERPL DL<=0.005 MIU/L-ACNC: 0.95 MU/L (ref 0.4–4)
WBC # BLD AUTO: 5.2 10E9/L (ref 4–11)

## 2019-03-01 PROCEDURE — 85027 COMPLETE CBC AUTOMATED: CPT | Performed by: INTERNAL MEDICINE

## 2019-03-01 PROCEDURE — 99396 PREV VISIT EST AGE 40-64: CPT | Performed by: INTERNAL MEDICINE

## 2019-03-01 PROCEDURE — G0103 PSA SCREENING: HCPCS | Performed by: INTERNAL MEDICINE

## 2019-03-01 PROCEDURE — 85652 RBC SED RATE AUTOMATED: CPT | Performed by: INTERNAL MEDICINE

## 2019-03-01 PROCEDURE — 80061 LIPID PANEL: CPT | Performed by: INTERNAL MEDICINE

## 2019-03-01 PROCEDURE — 80053 COMPREHEN METABOLIC PANEL: CPT | Performed by: INTERNAL MEDICINE

## 2019-03-01 PROCEDURE — 86140 C-REACTIVE PROTEIN: CPT | Performed by: INTERNAL MEDICINE

## 2019-03-01 PROCEDURE — 84443 ASSAY THYROID STIM HORMONE: CPT | Performed by: INTERNAL MEDICINE

## 2019-03-01 PROCEDURE — 36415 COLL VENOUS BLD VENIPUNCTURE: CPT | Performed by: INTERNAL MEDICINE

## 2019-03-01 RX ORDER — HYDROCHLOROTHIAZIDE 12.5 MG/1
12.5 TABLET ORAL DAILY
Qty: 90 TABLET | Refills: 3 | Status: SHIPPED | OUTPATIENT
Start: 2019-03-01

## 2019-03-01 RX ORDER — AMLODIPINE BESYLATE 5 MG/1
TABLET ORAL
Qty: 90 TABLET | Refills: 3 | Status: SHIPPED | OUTPATIENT
Start: 2019-03-01

## 2019-03-01 ASSESSMENT — PAIN SCALES - GENERAL: PAINLEVEL: NO PAIN (0)

## 2019-03-01 ASSESSMENT — MIFFLIN-ST. JEOR: SCORE: 1867.98

## 2019-03-01 NOTE — PROGRESS NOTES
SUBJECTIVE:   CC: Tommy Kaba is an 53 year old male who presents for preventative health visit.     Physical   Annual:     Getting at least 3 servings of Calcium per day:  Yes    Bi-annual eye exam:  Yes    Dental care twice a year:  NO    Sleep apnea or symptoms of sleep apnea:  None    Diet:  Low salt    PHQ-2 Total Score: 0    Had some episodes of lightheadedness, bp was running high.  He is taking the medications, needs refills.  He has hydrochlorothiazide, amlodipine.     Tried atorvastatin and crestor and had side effects, muscle aches so off them.        Today's PHQ-2 Score:   PHQ-2 ( 1999 [employer]) 3/1/2019   Q1: Little interest or pleasure in doing things 0   Q2: Feeling down, depressed or hopeless 0   PHQ-2 Score 0   Q1: Little interest or pleasure in doing things Not at all   Q2: Feeling down, depressed or hopeless Not at all   PHQ-2 Score 0       Abuse: Current or Past(Physical, Sexual or Emotional)- No  Do you feel safe in your environment? Yes    Social History     Tobacco Use     Smoking status: Never Smoker     Smokeless tobacco: Never Used   Substance Use Topics     Alcohol use: Yes     Alcohol/week: 1.0 oz     Types: 2 Standard drinks or equivalent per week     Alcohol Use 3/1/2019   If you drink alcohol do you typically have greater than 3 drinks per day OR greater than 7 drinks per week? No       Last PSA:   PSA   Date Value Ref Range Status   06/05/2017 0.90 0 - 4 ug/L Final     Comment:     Assay Method:  Chemiluminescence using Siemens Vista analyzer       Reviewed orders with patient. 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  GI: NEGATIVE for nausea, abdominal pain, heartburn, or change in bowel habits   male: negative for dysuria, hematuria, decreased urinary stream, erectile dysfunction, urethral discharge  MUSCULOSKELETAL: NEGATIVE for significant arthralgias or myalgia  NEURO: NEGATIVE for weakness, dizziness or paresthesias  PSYCHIATRIC: NEGATIVE for changes in mood or affect    OBJECTIVE:   /88 (BP Location: Right arm, Patient Position: Sitting, Cuff Size: Adult Large)   Pulse 64   Temp 97.3  F (36.3  C) (Temporal)   Resp 16   Ht 1.765 m (5' 9.5\")   Wt 103 kg (227 lb)   SpO2 97%   BMI 33.04 kg/m      Physical Exam  GENERAL: healthy, alert and no distress  EYES: Eyes grossly normal to inspection, PERRL and conjunctivae and sclerae normal  HENT: ear canals and TM's normal, nose and mouth without ulcers or lesions  NECK: no adenopathy, no asymmetry, masses, or scars and thyroid normal to palpation  RESP: lungs clear to auscultation - no rales, rhonchi or wheezes  CV: regular rate and rhythm, normal S1 S2, no S3 or S4, no murmur, click or rub, no peripheral edema and peripheral pulses strong  ABDOMEN: soft, nontender, no hepatosplenomegaly, no masses and bowel sounds normal  RECTAL: deferred  MS: no gross musculoskeletal defects noted, no edema  SKIN: no suspicious lesions or rashes  NEURO: Normal strength and tone, mentation intact and speech normal  PSYCH: mentation appears normal, affect normal/bright      ASSESSMENT/PLAN:       ICD-10-CM    1. CARDIOVASCULAR SCREENING; LDL GOAL LESS THAN 130 Z13.6    2. Benign essential hypertension I10 CBC with platelets     Comprehensive metabolic panel     TSH with free T4 reflex     Lipid Profile     ESR: Erythrocyte sedimentation rate     CRP, inflammation     hydrochlorothiazide (HYDRODIURIL) 12.5 MG tablet     amLODIPine (NORVASC) 5 " "MG tablet   3. Itching L29.9 CBC with platelets     ESR: Erythrocyte sedimentation rate     CRP, inflammation   4. Screening for prostate cancer Z12.5 PSA, screen   5. Chest pain, unspecified type R07.9 Echo Stress Echocardiogram   6. 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 Will get a stress echo. He is leaving town, clearly told if he has pain that doesn't go a way he needs to go to an ER.      htn will continue the amlodipine, should take the hydrochlorothiazide but he says it makes him urinate too much, will try lower dose     Colonoscopy is due in the fall.    Will do labs for lipids, doesn't tolerate statins.  COUNSELING:   Reviewed preventive health counseling, as reflected in patient instructions       Regular exercise       Healthy diet/nutrition    BP Readings from Last 1 Encounters:   08/20/18 120/82     Estimated body mass index is 33.04 kg/m  as calculated from the following:    Height as of 6/22/18: 1.765 m (5' 9.5\").    Weight as of 8/20/18: 103 kg (227 lb).       reports that  has never smoked. he has never used smokeless tobacco.      Counseling Resources:  ATP IV Guidelines  Pooled Cohorts Equation Calculator  FRAX Risk Assessment  ICSI Preventive Guidelines  Dietary Guidelines for Americans, 2010  USDA's MyPlate  ASA Prophylaxis  Lung CA Screening    Aquiles Daley MD  [facility]  "

## 2019-03-25 ENCOUNTER — HOSPITAL ENCOUNTER (OUTPATIENT)
Dept: CARDIOLOGY | Facility: CLINIC | Age: 54
Discharge: HOME | End: 2019-03-25
Attending: INTERNAL MEDICINE | Admitting: INTERNAL MEDICINE
Payer: COMMERCIAL

## 2019-03-25 PROCEDURE — 93321 DOPPLER ECHO F-UP/LMTD STD: CPT | Mod: 26 | Performed by: INTERNAL MEDICINE

## 2019-03-25 PROCEDURE — 93016 CV STRESS TEST SUPVJ ONLY: CPT | Performed by: INTERNAL MEDICINE

## 2019-03-25 PROCEDURE — 93018 CV STRESS TEST I&R ONLY: CPT | Performed by: INTERNAL MEDICINE

## 2019-03-25 PROCEDURE — 93350 STRESS TTE ONLY: CPT | Mod: 26 | Performed by: INTERNAL MEDICINE

## 2019-03-25 PROCEDURE — 93325 DOPPLER ECHO COLOR FLOW MAPG: CPT | Mod: 26 | Performed by: INTERNAL MEDICINE

## 2019-03-25 PROCEDURE — 25500064 ZZH RX 255 OP 636: Performed by: INTERNAL MEDICINE

## 2019-03-25 PROCEDURE — 93350 STRESS TTE ONLY: CPT | Mod: TC

## 2019-03-25 RX ADMIN — HUMAN ALBUMIN MICROSPHERES AND PERFLUTREN 9 ML: 10; .22 INJECTION, SOLUTION INTRAVENOUS at 09:00
